# Patient Record
Sex: FEMALE | Race: WHITE | NOT HISPANIC OR LATINO | Employment: FULL TIME | URBAN - METROPOLITAN AREA
[De-identification: names, ages, dates, MRNs, and addresses within clinical notes are randomized per-mention and may not be internally consistent; named-entity substitution may affect disease eponyms.]

---

## 2019-07-01 ENCOUNTER — TRANSFERRED RECORDS (OUTPATIENT)
Dept: HEALTH INFORMATION MANAGEMENT | Facility: CLINIC | Age: 24
End: 2019-07-01

## 2019-07-01 LAB
CHLAMYDIA - HIM PATIENT REPORTED: NEGATIVE
PAP SMEAR - HIM PATIENT REPORTED: NEGATIVE

## 2019-11-12 ENCOUNTER — APPOINTMENT (OUTPATIENT)
Dept: ULTRASOUND IMAGING | Facility: CLINIC | Age: 24
End: 2019-11-12
Attending: EMERGENCY MEDICINE
Payer: COMMERCIAL

## 2019-11-12 ENCOUNTER — ANESTHESIA EVENT (OUTPATIENT)
Dept: SURGERY | Facility: CLINIC | Age: 24
End: 2019-11-12
Payer: COMMERCIAL

## 2019-11-12 ENCOUNTER — APPOINTMENT (OUTPATIENT)
Dept: GENERAL RADIOLOGY | Facility: CLINIC | Age: 24
End: 2019-11-12
Attending: INTERNAL MEDICINE
Payer: COMMERCIAL

## 2019-11-12 ENCOUNTER — ANESTHESIA (OUTPATIENT)
Dept: SURGERY | Facility: CLINIC | Age: 24
End: 2019-11-12
Payer: COMMERCIAL

## 2019-11-12 ENCOUNTER — APPOINTMENT (OUTPATIENT)
Dept: CT IMAGING | Facility: CLINIC | Age: 24
End: 2019-11-12
Attending: EMERGENCY MEDICINE
Payer: COMMERCIAL

## 2019-11-12 ENCOUNTER — HOSPITAL ENCOUNTER (INPATIENT)
Facility: CLINIC | Age: 24
LOS: 1 days | Discharge: HOME | End: 2019-11-13
Attending: EMERGENCY MEDICINE | Admitting: INTERNAL MEDICINE
Payer: COMMERCIAL

## 2019-11-12 ENCOUNTER — PREP FOR PROCEDURE (OUTPATIENT)
Dept: UROLOGY | Facility: CLINIC | Age: 24
End: 2019-11-12

## 2019-11-12 DIAGNOSIS — N20.1 RIGHT URETERAL CALCULUS: Primary | ICD-10-CM

## 2019-11-12 DIAGNOSIS — N20.1 URETEROLITHIASIS: ICD-10-CM

## 2019-11-12 DIAGNOSIS — K70.10 ALCOHOLIC HEPATITIS WITHOUT ASCITES (H): ICD-10-CM

## 2019-11-12 PROBLEM — K75.9 HEPATITIS: Status: ACTIVE | Noted: 2019-11-12

## 2019-11-12 LAB
ALBUMIN SERPL-MCNC: 4.1 G/DL (ref 3.4–5)
ALBUMIN UR-MCNC: NEGATIVE MG/DL
ALP SERPL-CCNC: 71 U/L (ref 40–150)
ALT SERPL W P-5'-P-CCNC: 539 U/L (ref 0–50)
ANION GAP SERPL CALCULATED.3IONS-SCNC: 7 MMOL/L (ref 3–14)
APPEARANCE UR: CLEAR
AST SERPL W P-5'-P-CCNC: 506 U/L (ref 0–45)
BACTERIA #/AREA URNS HPF: ABNORMAL /HPF
BASOPHILS # BLD AUTO: 0 10E9/L (ref 0–0.2)
BASOPHILS NFR BLD AUTO: 0.9 %
BILIRUB SERPL-MCNC: 0.5 MG/DL (ref 0.2–1.3)
BILIRUB UR QL STRIP: NEGATIVE
BUN SERPL-MCNC: 9 MG/DL (ref 7–30)
CALCIUM SERPL-MCNC: 8.6 MG/DL (ref 8.5–10.1)
CHLORIDE SERPL-SCNC: 109 MMOL/L (ref 94–109)
CO2 SERPL-SCNC: 29 MMOL/L (ref 20–32)
COLOR UR AUTO: ABNORMAL
CREAT SERPL-MCNC: 0.76 MG/DL (ref 0.52–1.04)
DIFFERENTIAL METHOD BLD: ABNORMAL
EOSINOPHIL # BLD AUTO: 0.2 10E9/L (ref 0–0.7)
EOSINOPHIL NFR BLD AUTO: 6.5 %
ERYTHROCYTE [DISTWIDTH] IN BLOOD BY AUTOMATED COUNT: 13.3 % (ref 10–15)
ETHANOL SERPL-MCNC: 0.24 G/DL
GFR SERPL CREATININE-BSD FRML MDRD: >90 ML/MIN/{1.73_M2}
GLUCOSE SERPL-MCNC: 103 MG/DL (ref 70–99)
GLUCOSE UR STRIP-MCNC: NEGATIVE MG/DL
HCG SERPL QL: NEGATIVE
HCT VFR BLD AUTO: 45.2 % (ref 35–47)
HGB BLD-MCNC: 14.4 G/DL (ref 11.7–15.7)
HGB UR QL STRIP: ABNORMAL
IMM GRANULOCYTES # BLD: 0 10E9/L (ref 0–0.4)
IMM GRANULOCYTES NFR BLD: 0.3 %
KETONES UR STRIP-MCNC: NEGATIVE MG/DL
LEUKOCYTE ESTERASE UR QL STRIP: NEGATIVE
LIPASE SERPL-CCNC: 63 U/L (ref 73–393)
LYMPHOCYTES # BLD AUTO: 1.4 10E9/L (ref 0.8–5.3)
LYMPHOCYTES NFR BLD AUTO: 41.2 %
MCH RBC QN AUTO: 30.4 PG (ref 26.5–33)
MCHC RBC AUTO-ENTMCNC: 31.9 G/DL (ref 31.5–36.5)
MCV RBC AUTO: 95 FL (ref 78–100)
MONOCYTES # BLD AUTO: 0.3 10E9/L (ref 0–1.3)
MONOCYTES NFR BLD AUTO: 9.4 %
MUCOUS THREADS #/AREA URNS LPF: PRESENT /LPF
NEUTROPHILS # BLD AUTO: 1.4 10E9/L (ref 1.6–8.3)
NEUTROPHILS NFR BLD AUTO: 41.7 %
NITRATE UR QL: NEGATIVE
NRBC # BLD AUTO: 0 10*3/UL
NRBC BLD AUTO-RTO: 0 /100
PH UR STRIP: 6 PH (ref 5–7)
PLATELET # BLD AUTO: 159 10E9/L (ref 150–450)
POTASSIUM SERPL-SCNC: 3.7 MMOL/L (ref 3.4–5.3)
PROT SERPL-MCNC: 7.4 G/DL (ref 6.8–8.8)
RBC # BLD AUTO: 4.74 10E12/L (ref 3.8–5.2)
RBC #/AREA URNS AUTO: 2 /HPF (ref 0–2)
SODIUM SERPL-SCNC: 145 MMOL/L (ref 133–144)
SOURCE: ABNORMAL
SP GR UR STRIP: 1.01 (ref 1–1.03)
SQUAMOUS #/AREA URNS AUTO: <1 /HPF (ref 0–1)
UROBILINOGEN UR STRIP-MCNC: NORMAL MG/DL (ref 0–2)
WBC # BLD AUTO: 3.4 10E9/L (ref 4–11)
WBC #/AREA URNS AUTO: 1 /HPF (ref 0–5)

## 2019-11-12 PROCEDURE — 96361 HYDRATE IV INFUSION ADD-ON: CPT

## 2019-11-12 PROCEDURE — 40000278 XR SURGERY CARM FLUORO LESS THAN 5 MIN: Mod: TC

## 2019-11-12 PROCEDURE — 25000128 H RX IP 250 OP 636: Performed by: NURSE ANESTHETIST, CERTIFIED REGISTERED

## 2019-11-12 PROCEDURE — 99285 EMERGENCY DEPT VISIT HI MDM: CPT | Mod: 25

## 2019-11-12 PROCEDURE — 25000128 H RX IP 250 OP 636: Performed by: PHYSICIAN ASSISTANT

## 2019-11-12 PROCEDURE — 27210794 ZZH OR GENERAL SUPPLY STERILE: Performed by: UROLOGY

## 2019-11-12 PROCEDURE — 96374 THER/PROPH/DIAG INJ IV PUSH: CPT

## 2019-11-12 PROCEDURE — 37000008 ZZH ANESTHESIA TECHNICAL FEE, 1ST 30 MIN: Performed by: UROLOGY

## 2019-11-12 PROCEDURE — 25000128 H RX IP 250 OP 636: Performed by: EMERGENCY MEDICINE

## 2019-11-12 PROCEDURE — 25000125 ZZHC RX 250: Performed by: NURSE ANESTHETIST, CERTIFIED REGISTERED

## 2019-11-12 PROCEDURE — 80320 DRUG SCREEN QUANTALCOHOLS: CPT | Performed by: INTERNAL MEDICINE

## 2019-11-12 PROCEDURE — 25000132 ZZH RX MED GY IP 250 OP 250 PS 637: Performed by: UROLOGY

## 2019-11-12 PROCEDURE — 81001 URINALYSIS AUTO W/SCOPE: CPT | Performed by: EMERGENCY MEDICINE

## 2019-11-12 PROCEDURE — 36000054 ZZH SURGERY LEVEL 2 W FLUORO 1ST 30 MIN: Performed by: UROLOGY

## 2019-11-12 PROCEDURE — 25800025 ZZH RX 258: Performed by: UROLOGY

## 2019-11-12 PROCEDURE — 25800030 ZZH RX IP 258 OP 636: Performed by: NURSE ANESTHETIST, CERTIFIED REGISTERED

## 2019-11-12 PROCEDURE — 84703 CHORIONIC GONADOTROPIN ASSAY: CPT | Performed by: EMERGENCY MEDICINE

## 2019-11-12 PROCEDURE — 25800030 ZZH RX IP 258 OP 636: Performed by: EMERGENCY MEDICINE

## 2019-11-12 PROCEDURE — 12000000 ZZH R&B MED SURG/OB

## 2019-11-12 PROCEDURE — 40000306 ZZH STATISTIC PRE PROC ASSESS II: Performed by: UROLOGY

## 2019-11-12 PROCEDURE — 76700 US EXAM ABDOM COMPLETE: CPT

## 2019-11-12 PROCEDURE — 74176 CT ABD & PELVIS W/O CONTRAST: CPT

## 2019-11-12 PROCEDURE — 83690 ASSAY OF LIPASE: CPT | Performed by: EMERGENCY MEDICINE

## 2019-11-12 PROCEDURE — 0T768DZ DILATION OF RIGHT URETER WITH INTRALUMINAL DEVICE, VIA NATURAL OR ARTIFICIAL OPENING ENDOSCOPIC: ICD-10-PCS | Performed by: UROLOGY

## 2019-11-12 PROCEDURE — 52332 CYSTOSCOPY AND TREATMENT: CPT | Mod: RT | Performed by: UROLOGY

## 2019-11-12 PROCEDURE — 96376 TX/PRO/DX INJ SAME DRUG ADON: CPT

## 2019-11-12 PROCEDURE — 85025 COMPLETE CBC W/AUTO DIFF WBC: CPT | Performed by: EMERGENCY MEDICINE

## 2019-11-12 PROCEDURE — 25000132 ZZH RX MED GY IP 250 OP 250 PS 637: Performed by: PHYSICIAN ASSISTANT

## 2019-11-12 PROCEDURE — 25800030 ZZH RX IP 258 OP 636: Performed by: PHYSICIAN ASSISTANT

## 2019-11-12 PROCEDURE — C2617 STENT, NON-COR, TEM W/O DEL: HCPCS | Performed by: UROLOGY

## 2019-11-12 PROCEDURE — 96375 TX/PRO/DX INJ NEW DRUG ADDON: CPT

## 2019-11-12 PROCEDURE — 25000128 H RX IP 250 OP 636: Performed by: UROLOGY

## 2019-11-12 PROCEDURE — C1769 GUIDE WIRE: HCPCS | Performed by: UROLOGY

## 2019-11-12 PROCEDURE — 80320 DRUG SCREEN QUANTALCOHOLS: CPT | Performed by: EMERGENCY MEDICINE

## 2019-11-12 PROCEDURE — 71000012 ZZH RECOVERY PHASE 1 LEVEL 1 FIRST HR: Performed by: UROLOGY

## 2019-11-12 PROCEDURE — 99222 1ST HOSP IP/OBS MODERATE 55: CPT | Mod: AI | Performed by: INTERNAL MEDICINE

## 2019-11-12 PROCEDURE — 80053 COMPREHEN METABOLIC PANEL: CPT | Performed by: EMERGENCY MEDICINE

## 2019-11-12 DEVICE — STENT URETERAL POLARIS ULTRA 5FRX24CM M0061921220
Type: IMPLANTABLE DEVICE | Site: URETER | Status: NON-FUNCTIONAL
Removed: 2019-12-26

## 2019-11-12 RX ORDER — PROCHLORPERAZINE 25 MG
25 SUPPOSITORY, RECTAL RECTAL EVERY 12 HOURS PRN
Status: DISCONTINUED | OUTPATIENT
Start: 2019-11-12 | End: 2019-11-13 | Stop reason: HOSPADM

## 2019-11-12 RX ORDER — LEVONORGESTREL / ETHINYL ESTRADIOL AND ETHINYL ESTRADIOL 150-30(84)
1 KIT ORAL DAILY
COMMUNITY
End: 2020-07-02

## 2019-11-12 RX ORDER — MIRTAZAPINE 45 MG/1
45 TABLET, FILM COATED ORAL
COMMUNITY
End: 2019-12-04

## 2019-11-12 RX ORDER — ONDANSETRON 2 MG/ML
4 INJECTION INTRAMUSCULAR; INTRAVENOUS EVERY 30 MIN PRN
Status: COMPLETED | OUTPATIENT
Start: 2019-11-12 | End: 2019-11-12

## 2019-11-12 RX ORDER — AMOXICILLIN 250 MG
2 CAPSULE ORAL 2 TIMES DAILY PRN
Status: DISCONTINUED | OUTPATIENT
Start: 2019-11-12 | End: 2019-11-13 | Stop reason: HOSPADM

## 2019-11-12 RX ORDER — PROPOFOL 10 MG/ML
INJECTION, EMULSION INTRAVENOUS PRN
Status: DISCONTINUED | OUTPATIENT
Start: 2019-11-12 | End: 2019-11-12

## 2019-11-12 RX ORDER — NALOXONE HYDROCHLORIDE 0.4 MG/ML
.1-.4 INJECTION, SOLUTION INTRAMUSCULAR; INTRAVENOUS; SUBCUTANEOUS
Status: DISCONTINUED | OUTPATIENT
Start: 2019-11-12 | End: 2019-11-12 | Stop reason: HOSPADM

## 2019-11-12 RX ORDER — DEXAMETHASONE SODIUM PHOSPHATE 4 MG/ML
INJECTION, SOLUTION INTRA-ARTICULAR; INTRALESIONAL; INTRAMUSCULAR; INTRAVENOUS; SOFT TISSUE PRN
Status: DISCONTINUED | OUTPATIENT
Start: 2019-11-12 | End: 2019-11-12

## 2019-11-12 RX ORDER — CIPROFLOXACIN 250 MG/1
250 TABLET, FILM COATED ORAL EVERY 12 HOURS SCHEDULED
Status: DISCONTINUED | OUTPATIENT
Start: 2019-11-12 | End: 2019-11-13 | Stop reason: HOSPADM

## 2019-11-12 RX ORDER — TAMSULOSIN HYDROCHLORIDE 0.4 MG/1
0.4 CAPSULE ORAL DAILY
Status: DISCONTINUED | OUTPATIENT
Start: 2019-11-12 | End: 2019-11-12

## 2019-11-12 RX ORDER — MORPHINE SULFATE 4 MG/ML
4 INJECTION, SOLUTION INTRAMUSCULAR; INTRAVENOUS
Status: DISCONTINUED | OUTPATIENT
Start: 2019-11-12 | End: 2019-11-12

## 2019-11-12 RX ORDER — CEFAZOLIN SODIUM 2 G/50ML
2 SOLUTION INTRAVENOUS
Status: CANCELLED | OUTPATIENT
Start: 2019-11-12

## 2019-11-12 RX ORDER — LIDOCAINE 40 MG/G
CREAM TOPICAL
Status: DISCONTINUED | OUTPATIENT
Start: 2019-11-12 | End: 2019-11-13 | Stop reason: HOSPADM

## 2019-11-12 RX ORDER — ONDANSETRON 4 MG/1
4 TABLET, ORALLY DISINTEGRATING ORAL EVERY 30 MIN PRN
Status: DISCONTINUED | OUTPATIENT
Start: 2019-11-12 | End: 2019-11-12 | Stop reason: HOSPADM

## 2019-11-12 RX ORDER — HYDRALAZINE HYDROCHLORIDE 20 MG/ML
2.5-5 INJECTION INTRAMUSCULAR; INTRAVENOUS EVERY 10 MIN PRN
Status: DISCONTINUED | OUTPATIENT
Start: 2019-11-12 | End: 2019-11-12 | Stop reason: HOSPADM

## 2019-11-12 RX ORDER — CEFAZOLIN SODIUM 1 G/50ML
1 INJECTION, SOLUTION INTRAVENOUS SEE ADMIN INSTRUCTIONS
Status: CANCELLED | OUTPATIENT
Start: 2019-11-12

## 2019-11-12 RX ORDER — AMOXICILLIN 250 MG
1 CAPSULE ORAL 2 TIMES DAILY PRN
Status: DISCONTINUED | OUTPATIENT
Start: 2019-11-12 | End: 2019-11-13 | Stop reason: HOSPADM

## 2019-11-12 RX ORDER — FENTANYL CITRATE 50 UG/ML
25-50 INJECTION, SOLUTION INTRAMUSCULAR; INTRAVENOUS
Status: DISCONTINUED | OUTPATIENT
Start: 2019-11-12 | End: 2019-11-12 | Stop reason: HOSPADM

## 2019-11-12 RX ORDER — SODIUM CHLORIDE, SODIUM LACTATE, POTASSIUM CHLORIDE, CALCIUM CHLORIDE 600; 310; 30; 20 MG/100ML; MG/100ML; MG/100ML; MG/100ML
INJECTION, SOLUTION INTRAVENOUS CONTINUOUS
Status: DISCONTINUED | OUTPATIENT
Start: 2019-11-12 | End: 2019-11-12 | Stop reason: HOSPADM

## 2019-11-12 RX ORDER — KETOROLAC TROMETHAMINE 30 MG/ML
30 INJECTION, SOLUTION INTRAMUSCULAR; INTRAVENOUS ONCE
Status: COMPLETED | OUTPATIENT
Start: 2019-11-12 | End: 2019-11-12

## 2019-11-12 RX ORDER — SODIUM CHLORIDE, SODIUM LACTATE, POTASSIUM CHLORIDE, CALCIUM CHLORIDE 600; 310; 30; 20 MG/100ML; MG/100ML; MG/100ML; MG/100ML
INJECTION, SOLUTION INTRAVENOUS CONTINUOUS PRN
Status: DISCONTINUED | OUTPATIENT
Start: 2019-11-12 | End: 2019-11-12

## 2019-11-12 RX ORDER — HYDROMORPHONE HYDROCHLORIDE 1 MG/ML
.3-.5 INJECTION, SOLUTION INTRAMUSCULAR; INTRAVENOUS; SUBCUTANEOUS
Status: DISCONTINUED | OUTPATIENT
Start: 2019-11-12 | End: 2019-11-12

## 2019-11-12 RX ORDER — LANOLIN ALCOHOL/MO/W.PET/CERES
3 CREAM (GRAM) TOPICAL
Status: DISCONTINUED | OUTPATIENT
Start: 2019-11-12 | End: 2019-11-13 | Stop reason: HOSPADM

## 2019-11-12 RX ORDER — ONDANSETRON 4 MG/1
4 TABLET, ORALLY DISINTEGRATING ORAL EVERY 6 HOURS PRN
Status: DISCONTINUED | OUTPATIENT
Start: 2019-11-12 | End: 2019-11-13 | Stop reason: HOSPADM

## 2019-11-12 RX ORDER — LIDOCAINE HYDROCHLORIDE 10 MG/ML
INJECTION, SOLUTION INFILTRATION; PERINEURAL PRN
Status: DISCONTINUED | OUTPATIENT
Start: 2019-11-12 | End: 2019-11-12

## 2019-11-12 RX ORDER — KETOROLAC TROMETHAMINE 15 MG/ML
15 INJECTION, SOLUTION INTRAMUSCULAR; INTRAVENOUS EVERY 6 HOURS PRN
Status: DISCONTINUED | OUTPATIENT
Start: 2019-11-12 | End: 2019-11-13 | Stop reason: HOSPADM

## 2019-11-12 RX ORDER — HYDROMORPHONE HYDROCHLORIDE 1 MG/ML
.3-.5 INJECTION, SOLUTION INTRAMUSCULAR; INTRAVENOUS; SUBCUTANEOUS EVERY 10 MIN PRN
Status: DISCONTINUED | OUTPATIENT
Start: 2019-11-12 | End: 2019-11-12 | Stop reason: HOSPADM

## 2019-11-12 RX ORDER — ONDANSETRON 2 MG/ML
4 INJECTION INTRAMUSCULAR; INTRAVENOUS EVERY 30 MIN PRN
Status: DISCONTINUED | OUTPATIENT
Start: 2019-11-12 | End: 2019-11-12 | Stop reason: HOSPADM

## 2019-11-12 RX ORDER — MIRTAZAPINE 15 MG/1
45 TABLET, FILM COATED ORAL AT BEDTIME
Status: DISCONTINUED | OUTPATIENT
Start: 2019-11-12 | End: 2019-11-13 | Stop reason: HOSPADM

## 2019-11-12 RX ORDER — FENTANYL CITRATE 50 UG/ML
INJECTION, SOLUTION INTRAMUSCULAR; INTRAVENOUS PRN
Status: DISCONTINUED | OUTPATIENT
Start: 2019-11-12 | End: 2019-11-12

## 2019-11-12 RX ORDER — GLYCOPYRROLATE 0.2 MG/ML
INJECTION, SOLUTION INTRAMUSCULAR; INTRAVENOUS PRN
Status: DISCONTINUED | OUTPATIENT
Start: 2019-11-12 | End: 2019-11-12

## 2019-11-12 RX ORDER — ONDANSETRON 2 MG/ML
4 INJECTION INTRAMUSCULAR; INTRAVENOUS EVERY 6 HOURS PRN
Status: DISCONTINUED | OUTPATIENT
Start: 2019-11-12 | End: 2019-11-13 | Stop reason: HOSPADM

## 2019-11-12 RX ORDER — PROCHLORPERAZINE MALEATE 5 MG
10 TABLET ORAL EVERY 6 HOURS PRN
Status: DISCONTINUED | OUTPATIENT
Start: 2019-11-12 | End: 2019-11-13 | Stop reason: HOSPADM

## 2019-11-12 RX ORDER — NALOXONE HYDROCHLORIDE 0.4 MG/ML
.1-.4 INJECTION, SOLUTION INTRAMUSCULAR; INTRAVENOUS; SUBCUTANEOUS
Status: DISCONTINUED | OUTPATIENT
Start: 2019-11-12 | End: 2019-11-13 | Stop reason: HOSPADM

## 2019-11-12 RX ORDER — LABETALOL 20 MG/4 ML (5 MG/ML) INTRAVENOUS SYRINGE
10
Status: DISCONTINUED | OUTPATIENT
Start: 2019-11-12 | End: 2019-11-12 | Stop reason: HOSPADM

## 2019-11-12 RX ORDER — MEPERIDINE HYDROCHLORIDE 50 MG/ML
12.5 INJECTION INTRAMUSCULAR; INTRAVENOUS; SUBCUTANEOUS
Status: DISCONTINUED | OUTPATIENT
Start: 2019-11-12 | End: 2019-11-12

## 2019-11-12 RX ORDER — SODIUM CHLORIDE AND POTASSIUM CHLORIDE 150; 900 MG/100ML; MG/100ML
INJECTION, SOLUTION INTRAVENOUS CONTINUOUS
Status: DISCONTINUED | OUTPATIENT
Start: 2019-11-12 | End: 2019-11-13 | Stop reason: HOSPADM

## 2019-11-12 RX ADMIN — MORPHINE SULFATE 4 MG: 4 INJECTION INTRAVENOUS at 05:37

## 2019-11-12 RX ADMIN — ONDANSETRON HYDROCHLORIDE 4 MG: 2 INJECTION, SOLUTION INTRAMUSCULAR; INTRAVENOUS at 05:37

## 2019-11-12 RX ADMIN — ONDANSETRON 4 MG: 2 INJECTION INTRAMUSCULAR; INTRAVENOUS at 18:21

## 2019-11-12 RX ADMIN — PROCHLORPERAZINE EDISYLATE 10 MG: 5 INJECTION, SOLUTION INTRAMUSCULAR; INTRAVENOUS at 13:38

## 2019-11-12 RX ADMIN — KETOROLAC TROMETHAMINE 30 MG: 30 INJECTION, SOLUTION INTRAMUSCULAR at 06:06

## 2019-11-12 RX ADMIN — TAMSULOSIN HYDROCHLORIDE 0.4 MG: 0.4 CAPSULE ORAL at 08:50

## 2019-11-12 RX ADMIN — CIPROFLOXACIN HYDROCHLORIDE 250 MG: 250 TABLET, FILM COATED ORAL at 22:38

## 2019-11-12 RX ADMIN — SODIUM CHLORIDE 1000 ML: 9 INJECTION, SOLUTION INTRAVENOUS at 11:24

## 2019-11-12 RX ADMIN — POTASSIUM CHLORIDE AND SODIUM CHLORIDE: 900; 150 INJECTION, SOLUTION INTRAVENOUS at 12:27

## 2019-11-12 RX ADMIN — DEXAMETHASONE SODIUM PHOSPHATE 4 MG: 4 INJECTION, SOLUTION INTRA-ARTICULAR; INTRALESIONAL; INTRAMUSCULAR; INTRAVENOUS; SOFT TISSUE at 18:16

## 2019-11-12 RX ADMIN — SODIUM CHLORIDE 1000 ML: 9 INJECTION, SOLUTION INTRAVENOUS at 05:37

## 2019-11-12 RX ADMIN — HYDROMORPHONE HYDROCHLORIDE 0.5 MG: 1 INJECTION, SOLUTION INTRAMUSCULAR; INTRAVENOUS; SUBCUTANEOUS at 13:10

## 2019-11-12 RX ADMIN — MIRTAZAPINE 45 MG: 15 TABLET, FILM COATED ORAL at 22:38

## 2019-11-12 RX ADMIN — GLYCOPYRROLATE 0.2 MG: 0.2 INJECTION, SOLUTION INTRAMUSCULAR; INTRAVENOUS at 18:16

## 2019-11-12 RX ADMIN — POTASSIUM CHLORIDE AND SODIUM CHLORIDE: 900; 150 INJECTION, SOLUTION INTRAVENOUS at 21:28

## 2019-11-12 RX ADMIN — ONDANSETRON HYDROCHLORIDE 4 MG: 2 INJECTION, SOLUTION INTRAMUSCULAR; INTRAVENOUS at 06:58

## 2019-11-12 RX ADMIN — FENTANYL CITRATE 50 MCG: 50 INJECTION, SOLUTION INTRAMUSCULAR; INTRAVENOUS at 18:19

## 2019-11-12 RX ADMIN — PROPOFOL 160 MG: 10 INJECTION, EMULSION INTRAVENOUS at 18:16

## 2019-11-12 RX ADMIN — ONDANSETRON HYDROCHLORIDE 4 MG: 2 INJECTION, SOLUTION INTRAMUSCULAR; INTRAVENOUS at 10:25

## 2019-11-12 RX ADMIN — HYDROMORPHONE HYDROCHLORIDE 1 MG: 1 INJECTION, SOLUTION INTRAMUSCULAR; INTRAVENOUS; SUBCUTANEOUS at 07:00

## 2019-11-12 RX ADMIN — LIDOCAINE HYDROCHLORIDE 50 MG: 10 INJECTION, SOLUTION INFILTRATION; PERINEURAL at 18:16

## 2019-11-12 RX ADMIN — FENTANYL CITRATE 50 MCG: 50 INJECTION, SOLUTION INTRAMUSCULAR; INTRAVENOUS at 18:10

## 2019-11-12 RX ADMIN — SODIUM CHLORIDE, POTASSIUM CHLORIDE, SODIUM LACTATE AND CALCIUM CHLORIDE: 600; 310; 30; 20 INJECTION, SOLUTION INTRAVENOUS at 18:08

## 2019-11-12 RX ADMIN — MIDAZOLAM 2 MG: 1 INJECTION INTRAMUSCULAR; INTRAVENOUS at 18:08

## 2019-11-12 ASSESSMENT — ENCOUNTER SYMPTOMS
VOMITING: 0
DIARRHEA: 0
HEMATURIA: 0
FLANK PAIN: 1
DYSURIA: 0
FEVER: 0
NAUSEA: 1

## 2019-11-12 ASSESSMENT — MIFFLIN-ST. JEOR
SCORE: 1371.86
SCORE: 1343.74

## 2019-11-12 ASSESSMENT — ACTIVITIES OF DAILY LIVING (ADL): ADLS_ACUITY_SCORE: 11

## 2019-11-12 NOTE — H&P
[facility]  History and Physical  Hospitalist       Date of Admission:  11/12/2019    Assessment & Plan   Brenda Blount is a 24 year old female with history of alcohol abuse who presents with right lateral abdomen/flank pain waking her from sleep and is found to have elevated liver enzymes and a 0.6 cm right-sided kidney stone with mild hydronephrosis.     Right nephrolithiasis with mild hydronephrosis  Seen by Urology in ED.  Plan is for procedure this afternoon.  In the meantime, IV fluids, pain control, antiemetics.  NPO pending procedure.      Transaminiitis, likely alcoholic hepatitis  No jaundice, abdominal pain, or Alk Phos/bili elevation.  Limited exposure risk for Hep B and C.  History of alcohol abuse.  Underwent treatment in [place] 12/2018 - 6/2019.  Ongoing intermittent use.  Reportedly 4 shots of vodka yesterday.  Unclear of baseline LFT levels.  IV fluids.  Narcotics for renal colic only - discontinue after procedure.  EtOH level added on.  CIWA.  Recheck CMP in AM.    DVT Prophylaxis: Low Risk/Ambulatory with no VTE prophylaxis indicated  Code Status: Full Code    Disposition: Expected discharge in 2-3 days once LFTs trending down.    TOTAL TIME SPENT:  Greater than 60 minutes spent on floor time including chart review, consultation and coordination of care.    Fatoumata Blanco PA-C    Primary Care Physician   No primary care provider on file.    Chief Complaint   Right sided pain    History is obtained from the patient    History of Present Illness   Brenda Blount is a 24 year old female with history of alcohol abuse who presents with right lateral abdomen/flank pain waking her from sleep and is found to have elevated liver enzymes and a 0.6 cm right-sided kidney stone with mild hydronephrosis.  Patient was awoke from sleep today with right-sided abdominal pain.  Pain was severe and she had associated nausea.  Due to the severity of the pain, and the fact it continued, she  presented to the ER for evaluation.    In the ED, temperature 97.6, heart rate 96, blood pressure 117/88, respiration 18, SPO2 98% on room air.  Sodium 145, potassium 3.7, chloride 109, bicarb 29, BUN 9, creatinine 0.76, glucose 103.  Total bilirubin 0.5, alkaline phosphate 71, , , lipase 63.  WBC 3.4, hemoglobin 14.4, platelet 159.  UA bland.  CT Abd/Pelv showing a 0.6 cm stone.  Abd U/S showed no cholelithiasis or biliary dilatation, negative [place], small gallbladder polyps, mild right hydronephrosis with 6 mm ureteral calculus.     Patient is forthcoming with alcohol abuse history.  Notes no other illness but she does mention occasional episodes of palpitations and racing heart.  This has not been worked up in the past.  Last episode was a couple days ago and lasted for about an hour.  No associated nausea, chest pain, dyspnea, sweating. No recent fevers, chills, URI symptoms, jorge colored stools, diarrhea, headaches, vision changes, rash, or other unusual illness.  No recent travel outside the .  No recent sick contacts.  No history of STIs.  Is not currently sexually active.    Past Medical History    I have reviewed this patient's medical history and updated it with pertinent information if needed.     Alcohol abuse.  Went to treatment 12/2018 in [place] for 7 months.     Past Surgical History   I have reviewed this patient's surgical history and updated it with pertinent information if needed.    No surgeries in the past    Prior to Admission Medications   Prior to Admission Medications   Prescriptions Last Dose Informant Patient Reported? Taking?   levonorgest-eth estrad 91-Day (CAMRESE) 0.15-0.03 &0.01 MG tablet Past Week at Unknown time  Yes Yes   Sig: Take 1 tablet by mouth daily   mirtazapine (REMERON) 45 MG tablet Past Week at Unknown time  Yes Yes   Sig: Take 45 mg by mouth nightly as needed      Facility-Administered Medications: None     Allergies   No Known Allergies    Social  History   Nonsmoker.  Still drinks occasionally.  Living currently with an ex-boyfriend.  She is planning to move back in with her Mom in [place].  Works part-time.  She has a primary care provider in [place] but states she cannot go back there because she owes them money.     Family History   I have reviewed this patient's family history and updated it with pertinent information if needed.   No family history of kidney stones or liver disease.    Review of Systems   14 point comprehensive ROS undertaken with pertinent positives and negatives as above and otherwise unremarkable.     Physical Exam   Temp: 97.6  F (36.4  C) Temp src: Oral BP: 107/83 Pulse: 90 Heart Rate: 96 Resp: 16 SpO2: 97 % O2 Device: None (Room air)    Vital Signs with Ranges  Temp:  [97.6  F (36.4  C)] 97.6  F (36.4  C)  Pulse:  [88-96] 90  Heart Rate:  [96] 96  Resp:  [16-18] 16  BP: (107-117)/(83-88) 107/83  SpO2:  [97 %-100 %] 97 %  0 lbs 0 oz    GENERAL:  Pleasant, cooperative, alert. Well developed, well nourished.   HEENT: Normocephalic, atraumatic.  Extra occular mm intact.  Sclera clear. PERRL.  Mucous membranes moist.  No exudate; uvula midline.    PULMONARY: Clear to auscultation bilaterally    CARDIAC: Regular rate and rhythm.  No appreciated murmur.     ABDOMEN: Soft, nontender non distended.    MUSCULOSKELETAL:  Moving x 4 spontaneously with CMS intact x4.  Normal bulk and tone.  No LE edema.   NEURO: Alert and oriented x3.  CN II-XII grossly intact and symmetric.  No ataxia or tremor.  Nonfocal exam.  SKIN:  Warm, pink, dry.  PSYCH:  Appropriate, fluid, linear      Data   Data reviewed today:  I personally reviewed no images or EKG's today.    Recent Labs   Lab 11/12/19  0538   WBC 3.4*   HGB 14.4   MCV 95      *   POTASSIUM 3.7   CHLORIDE 109   CO2 29   BUN 9   CR 0.76   ANIONGAP 7   EDER 8.6   *   ALBUMIN 4.1   PROTTOTAL 7.4   BILITOTAL 0.5   ALKPHOS 71   *   *   LIPASE 63*       Recent Results  (from the past 24 hour(s))   CT Abdomen Pelvis w/o Contrast    Narrative    EXAM: CT ABDOMEN PELVIS W/O CONTRAST  LOCATION: [facility]  DATE/TIME: 11/12/2019 6:22 AM    INDICATION: Flank pain, stone disease suspected.  COMPARISON: None.  TECHNIQUE: CT scan of the abdomen and pelvis was performed without IV contrast. Multiplanar reformats were obtained. Dose reduction techniques were used.  CONTRAST: None.    FINDINGS:   LOWER CHEST: Normal.    HEPATOBILIARY: Within normal limits. No radiopaque gallstones or bile duct dilatation.     PANCREAS: Unremarkable.    SPLEEN: Normal.    ADRENAL GLANDS: Normal.    KIDNEYS/BLADDER: Moderate right hydronephrosis due to a 0.6 cm stone in the proximal ureter. Punctate stone at the lower pole of the right kidney. No left renal stones or bladder calculi.    BOWEL: Normal. No bowel obstruction.    LYMPH NODES: Normal.    VASCULATURE: Unremarkable.    PELVIC ORGANS: Uterus is present. No suspicious adnexal masses without contrast.    OTHER: Evaluation of the solid abdominal organs is limited by lack of intravenous contrast. No ascites.    MUSCULOSKELETAL: Normal.      Impression    IMPRESSION:   1.  Moderate right hydronephrosis due to a 0.6 cm proximal ureteral stone.  2.  Tiny right renal stone.            Abdomen US, complete    Narrative    EXAM: US ABDOMEN COMPLETE  LOCATION: [facility]  DATE/TIME: 11/12/2019 6:33 AM    INDICATION: Upper abdominal pain and elevated LFTs.  COMPARISON: CT 11/12/2019  TECHNIQUE: Complete abdominal ultrasound.    FINDINGS:    GALLBLADDER: No visible cholelithiasis or wall thickening. Gallbladder polyps measuring up to 3 mm. Sonographic Olivares's sign negative.    BILE DUCTS: No biliary dilatation. The common duct measures 5 mm.    LIVER: Hepatic steatosis.    RIGHT KIDNEY: 10.6 cm. Mild hydronephrosis. 6 mm proximal ureteral calculus.    LEFT KIDNEY: 10.2 cm. No hydronephrosis.     SPLEEN: Normal.    PANCREAS: Partial  obstruction by bowel gas.    AORTA: Normal caliber where seen proximally.     IVC: Patent where seen.    No visible ascites.      Impression    IMPRESSION:  1.  No visible cholelithiasis or biliary dilatation. Sonographic Olivares's sign negative.  2.  Small gallbladder polyps. Consider one-year follow-up.  3.  Mild right hydronephrosis due to a 6 mm proximal ureteral calculus as seen on CT.

## 2019-11-12 NOTE — ED TRIAGE NOTES
Here for right sided abdominal cramping started about 1 hour ago waking patient up from sleep. Nausea. ABCs intact.

## 2019-11-12 NOTE — PHARMACY-ADMISSION MEDICATION HISTORY
Admission medication history interview status for this patient is complete. See [facility] admission navigator for allergy information, prior to admission medications and immunization status.     Medication history interview source(s):Patient  Medication history resources (including written lists, pill bottles, clinic record):None  [facility]    Changes made to PTA medication list:  Added: camrese, remeron  Deleted: none  Changed: none    Actions taken by pharmacist (provider contacted, etc):None     Additional medication history information:None    Medication reconciliation/reorder completed by provider prior to medication history?  No     Do you take OTC medications (eg tylenol, ibuprofen, fish oil, eye/ear drops, etc)? No     For patients on insulin therapy: No      Prior to Admission medications    Medication Sig Last Dose Taking? Auth Provider   levonorgest-eth estrad 91-Day (CAMRESE) 0.15-0.03 &0.01 MG tablet Take 1 tablet by mouth daily Past Week at Unknown time Yes Unknown, Entered By History   mirtazapine (REMERON) 45 MG tablet Take 45 mg by mouth nightly as needed Past Week at Unknown time Yes Unknown, Entered By History

## 2019-11-12 NOTE — CONSULTS
[facility] Consultation by [facility]    Brenda Blount MRN# [id number]   Age: 24 year old YOB: 1995     Date of Admission:  11/12/2019    Reason for consult: Renal/ureteral calculus       Requesting PA/MD: ER       Level of consult: Consult, follow and place orders           Assessment and Plan:   Assessment:   Renal colic, proximal right ureteral stone      Plan:   To OR later today for cysto, right stent given the proximal nature of the stone on CT.  Will need second procedure for stone extraction once the ureter has been dilated and stone can move more distal.    Start flomax and continue until next procedure for stent discomfort management.   Discussed with Dr. Cruz.  Can go home from recovery if meeting post op milestones.      Selina Granda PA-C  [facility]rology  [phone]                 Chief Complaint:   Abdominal pain     History is obtained from the patient and EMR.         History of Present Illness:     This patient is a 24 year old female being admitted for abdominal pain and found to have a 6mm  Proximal right ureteral stone. First stone episode. Ca 8.6, Cr 0.76, WBC 3.4. UA trace blood.       Voiding, pain better controlled since arrival, but actively vomiting.           Past Medical History:     None          Past Surgical History:     None          Social History:     Social History     Tobacco Use     Smoking status: Not on file   Substance Use Topics     Alcohol use: Not on file             Family History:     No hx of stones.             Allergies:     No Known Allergies          Medications:     Current Facility-Administered Medications   Medication     morphine (PF) injection 4 mg     ondansetron (ZOFRAN) injection 4 mg     No current outpatient medications on file.             Review of Systems:   A comprehensive review of systems was performed and found to be negative except as described in the HPI.    /87   Pulse 88   Temp 97.6  F (36.4  C)  (Oral)   Resp 18   SpO2 100%   PSYCH: mod distress, vomiting  EYES: EOMI  MOUTH: MMM  NECK: Supple, no notable adenopathy  RESP: Unlabored breathing  CARDIAC: No LE edema  SKIN: Warm, no rashes  ABD: soft, Nontender  NEURO: AAO x3           Data:     Lab Results   Component Value Date    WBC 3.4 (L) 11/12/2019    HGB 14.4 11/12/2019    HCT 45.2 11/12/2019    MCV 95 11/12/2019     11/12/2019     Lab Results   Component Value Date    CR 0.76 11/12/2019     EXAM: CT ABDOMEN PELVIS W/O CONTRAST  LOCATION: [facility]  DATE/TIME: 11/12/2019 6:22 AM     INDICATION: Flank pain, stone disease suspected.  COMPARISON: None.  TECHNIQUE: CT scan of the abdomen and pelvis was performed without IV contrast. Multiplanar reformats were obtained. Dose reduction techniques were used.  CONTRAST: None.     FINDINGS:   LOWER CHEST: Normal.     HEPATOBILIARY: Within normal limits. No radiopaque gallstones or bile duct dilatation.      PANCREAS: Unremarkable.     SPLEEN: Normal.     ADRENAL GLANDS: Normal.     KIDNEYS/BLADDER: Moderate right hydronephrosis due to a 0.6 cm stone in the proximal ureter. Punctate stone at the lower pole of the right kidney. No left renal stones or bladder calculi.     BOWEL: Normal. No bowel obstruction.     LYMPH NODES: Normal.     VASCULATURE: Unremarkable.     PELVIC ORGANS: Uterus is present. No suspicious adnexal masses without contrast.     OTHER: Evaluation of the solid abdominal organs is limited by lack of intravenous contrast. No ascites.     MUSCULOSKELETAL: Normal.                                                                      IMPRESSION:   1.  Moderate right hydronephrosis due to a 0.6 cm proximal ureteral stone.  2.  Tiny right renal stone.

## 2019-11-12 NOTE — PLAN OF CARE
"PRIMARY DIAGNOSIS: ACUTE PAIN/hepatitis/pre op for cystoscopy   OUTPATIENT/OBSERVATION GOALS TO BE MET BEFORE DISCHARGE:  1. Pain Status: Improved but still requiring IV narcotics.    2. Return to near baseline physical activity: Yes    3. Cleared for discharge by consultants (if involved): No    Discharge Planner Nurse   Safe discharge environment identified: Yes  Barriers to discharge: Yes       Entered by: Kendra Asencio 11/12/2019 2:04 PM  Please review provider order for any additional goals.   Nurse to notify provider when observation goals have been met and patient is ready for discharge.    AOx4. Up ad jevon in room. Friend at bedside. Pt. Reporting R flank/side pain 6/10 that is relieved with IV dilaudid. Imaging shows mod. R hydronephrosis w/ 0.6cm stone. LFTs elevated. Intermittent nausea-relieved with antiemetics. Fluids running. Straining urine. OR time of 1855 for cystoscopy and R double J stent. /80 (BP Location: Left arm)   Pulse 70   Temp 98.2  F (36.8  C) (Oral)   Resp 16   Ht 1.626 m (5' 4\")   Wt 60.4 kg (133 lb 1.6 oz)   SpO2 96%   BMI 22.85 kg/m     "

## 2019-11-12 NOTE — ED PROVIDER NOTES
History     Chief Complaint:  Abdominal Pain      HPI   Brenda Blount is a 24 year old female who presents with right upper flank tenderness and nausea for the past hour that woke her from sleep. Patient reports that she felt fine upon going to bed last night. She describes her pain as sharp. Denies fever, diarrhea, dysuria, hematuria. She has not yet taken any medications for her pain. Denies the possibility of pregnancy. No history of similar symptoms in the past. No recent surgeries or hospitalizations.     Allergies:  NKDA     Medications:    The patient is not currently taking any prescribed medications.      Past Medical History:    The patient does not have any past pertinent medical history.     Past Surgical History:    History reviewed. No pertinent surgical history.     Family History:    History reviewed. No pertinent family history.      Social History:  PCP: No primary care provider on file.  Presents to the ED with a friend.  Up to date on immunization.       Review of Systems   Constitutional: Negative for fever.   Gastrointestinal: Positive for nausea. Negative for diarrhea and vomiting.   Genitourinary: Positive for flank pain. Negative for dysuria and hematuria.   All other systems reviewed and are negative.      Physical Exam     Patient Vitals for the past 24 hrs:   BP Temp Temp src Pulse Heart Rate Resp SpO2   11/12/19 0650 111/87 -- -- 88 -- -- 100 %   11/12/19 0507 117/88 97.6  F (36.4  C) Oral 96 96 18 98 %        Physical Exam  Constitutional:       Appearance: She is well-developed.   HENT:      Head: Atraumatic.      Right Ear: External ear normal.      Left Ear: External ear normal.      Mouth/Throat:      Pharynx: No oropharyngeal exudate.   Eyes:      General: No scleral icterus.     Conjunctiva/sclera: Conjunctivae normal.      Pupils: Pupils are equal, round, and reactive to light.   Neck:      Musculoskeletal: Normal range of motion and neck supple.      Vascular: No JVD.    Cardiovascular:      Rate and Rhythm: Normal rate and regular rhythm.      Heart sounds: Normal heart sounds. No murmur. No friction rub. No gallop.    Pulmonary:      Effort: Pulmonary effort is normal. No respiratory distress.      Breath sounds: Normal breath sounds. No wheezing or rales.   Abdominal:      General: Bowel sounds are normal. There is no distension.      Palpations: Abdomen is soft. There is no mass.      Tenderness: There is tenderness (right upper flank).   Musculoskeletal: Normal range of motion.   Lymphadenopathy:      Cervical: No cervical adenopathy.   Skin:     General: Skin is warm and dry.      Findings: No rash.   Neurological:      Mental Status: She is alert and oriented to person, place, and time.      Cranial Nerves: No cranial nerve deficit.         Emergency Department Course     Imaging:  Radiographic findings were communicated with the patient who voiced understanding of the findings.    CT Abdomen Pelvis w/o Contrast  1.  Moderate right hydronephrosis due to a 0.6 cm proximal ureteral stone.  2.  Tiny right renal stone.  As read by Radiology.    Abdomen US, complete  1.  No visible cholelithiasis or biliary dilatation. Sonographic Olivares's sign negative.  2.  Small gallbladder polyps. Consider one-year follow-up.  3.  Mild right hydronephrosis due to a 6 mm proximal ureteral calculus as seen on CT.  As read by Radiology.    Laboratory:  UA: blood trace, bacteria few, mucous present, o/w negative     CMP: Sodium 145 (H), Glucose 103 (H),  (H),  (H), o/w WNL (Creatinine: 0.76)  CBC: WBC 3.4 (L), HGB 14.4,   Lipase: 63 (L)  HCG qual: negative     Interventions:  0537: NS 1L IV Bolus   0537: Morphine 4 mg IV  0537: Zofran 4 mg IV  0606: Toradol 30 mg IV  0658: Zofran 4 mg IV  0700: Dilaudid 1 mg IV    Emergency Department Course:  0511: Nursing notes and vitals reviewed. I performed an exam of the patient as documented above.     IV inserted. Medicine  administered as documented above. Blood drawn. This was sent to the lab for further testing, results above. The patient provided a urine sample here in the emergency department. This was sent for laboratory testing, findings above.      The patient was sent for an abdomen pelvis CT and abdomen ultrasound while in the emergency department, findings above.     0655: I rechecked the patient and discussed the results of her workup thus far. Patient reports that she has recently finished treatment for alcohol use but has been drinking intermittently. She drank 4 shots of vodka last night. She said dilaudid helped her pain but now she is having pain again.     0705: I consulted with Dr. Avila, Urology, regarding the patient's history and presentation here in the emergency department.    I consulted with the hospitalist services. They are in agreement to accept the patient for admission.    Findings and plan explained to the Patient who consents to admission.        Impression & Plan      Medical Decision Making:  Brenda Blount is a 24 year old female who presents with right sided flank and upper abdominal pain. Patient was quite uncomfortable initially and did not respond to morphine. She was given Toradol and dilaudid. Urine was negative for signs of infection, but CT did show a 6 mm stone on the right side with hydro. She interestingly enough had very high AST and ALT, so I did do an ultrasound to evaluate that. She did admit that she has been in treatment for alcoholism and has recently been drinking intermittently. She did admit to 3-4 shots of vodka last night, but she says she does not drink more than that. She is still quite uncomfortable and will need to be monitored for the hepatitis and will most likely be seen by urology to have a stent placed for her stone. She is comfortable with admission. She will be admitted to a regular med/surg bed.     Diagnosis:    ICD-10-CM    1. Ureterolithiasis N20.1    2.  Alcoholic hepatitis without ascites K70.10        Disposition:  Admitted to the hospital.     I, Kiara Montez, am serving as a scribe at 5:11 AM on 11/12/2019 to document services personally performed by Hardeep Qureshi MD based on my observations and the provider's statements to me.       Kiara Montez  11/12/2019   [facility] EMERGENCY DEPARTMENT       Hardeep Qureshi MD  11/13/19 0414

## 2019-11-12 NOTE — PLAN OF CARE
ROOM # 233    Living Situation (if not independent, order SW consult): Home with parents  Facility name: N/A  : Jayde (Mom) [phone]    Activity level at baseline: INd  Activity level on admit: Ind      Patient registered to observation; given Patient Bill of Rights; given the opportunity to ask questions about observation status and their plan of care.  Patient has been oriented to the observation room, bathroom and call light is in place.    Discussed discharge goals and expectations with patient/family.

## 2019-11-12 NOTE — ED NOTES
[facility]  ED Nurse Handoff Report    Brenda Blount is a 24 year old female   ED Chief complaint: Abdominal Pain  . ED Diagnosis:   Final diagnoses:   Ureterolithiasis   Alcoholic hepatitis without ascites     Allergies: No Known Allergies    Code Status: Full Code  Activity level - Baseline/Home:  Independent. Activity Level - Current:   Independent. Lift room needed: No. Bariatric: No   Needed: No   Isolation: No. Infection: Not Applicable.     Vital Signs:   Vitals:    11/12/19 0507 11/12/19 0650   BP: 117/88 111/87   Pulse: 96 88   Resp: 18    Temp: 97.6  F (36.4  C)    TempSrc: Oral    SpO2: 98% 100%       Cardiac Rhythm:  ,      Pain level: 0-10 Pain Scale: 0  Patient confused: No. Patient Falls Risk: No.   Elimination Status: Has voided   Patient Report - Initial Complaint: Here for right sided abdominal cramping started about 1 hour ago waking patient up from sleep. Nausea. ABCs intact. . Focused Assessment: Right flank pain, vomiting   Tests Performed: labs, CT, US. Abnormal Results:   Labs Ordered and Resulted from Time of ED Arrival Up to the Time of Departure from the ED   CBC WITH PLATELETS DIFFERENTIAL - Abnormal; Notable for the following components:       Result Value    WBC 3.4 (*)     Absolute Neutrophil 1.4 (*)     All other components within normal limits   COMPREHENSIVE METABOLIC PANEL - Abnormal; Notable for the following components:    Sodium 145 (*)     Glucose 103 (*)      (*)      (*)     All other components within normal limits   ROUTINE UA WITH MICROSCOPIC - Abnormal; Notable for the following components:    Blood Urine Trace (*)     Bacteria Urine Few (*)     Mucous Urine Present (*)     All other components within normal limits   LIPASE - Abnormal; Notable for the following components:    Lipase 63 (*)     All other components within normal limits   HCG QUALITATIVE   PULSE OXIMETRY NURSING   STRAIN URINE   PERIPHERAL IV CATHETER     Abdomen  US, complete   Final Result   IMPRESSION:   1.  No visible cholelithiasis or biliary dilatation. Sonographic Olivares's sign negative.   2.  Small gallbladder polyps. Consider one-year follow-up.   3.  Mild right hydronephrosis due to a 6 mm proximal ureteral calculus as seen on CT.      CT Abdomen Pelvis w/o Contrast   Final Result   IMPRESSION:    1.  Moderate right hydronephrosis due to a 0.6 cm proximal ureteral stone.   2.  Tiny right renal stone.                    .   Treatments provided: IV fluids, pain & nausea control  Family Comments: none  OBS brochure/video discussed/provided to patient:  Informed [place] [facility] [place] going to observation floor but not going to be in observation unit, so the OBS video is not required.  ED Medications:   Medications   morphine (PF) injection 4 mg (4 mg Intravenous Given 11/12/19 0537)   ondansetron (ZOFRAN) injection 4 mg (4 mg Intravenous Given 11/12/19 0658)   0.9% sodium chloride BOLUS (0 mLs Intravenous Stopped 11/12/19 0701)   ketorolac (TORADOL) injection 30 mg (30 mg Intravenous Given 11/12/19 0606)   HYDROmorphone (DILAUDID) injection 1 mg (1 mg Intravenous Given 11/12/19 0700)     Drips infusing:  No  For the majority of the shift, the patient's behavior Green. Interventions performed were none.     Severe Sepsis OR Septic Shock Diagnosis Present: No      ED Nurse Name/Phone Number: Ana Rosa Negron RN,   7:40 AM    RECEIVING UNIT ED HANDOFF REVIEW    Above ED Nurse Handoff Report was reviewed: Yes  Reviewed by: Kendra Asencio RN on November 12, 2019 at 10:21 AM

## 2019-11-12 NOTE — ANESTHESIA PREPROCEDURE EVALUATION
Anesthesia Pre-Procedure Evaluation    Patient: Brenda Blount   MRN: [id number] : 1995          Preoperative Diagnosis: Kidney stone [N20.0]    Procedure(s):  Cytoscopy with Right double J stent    No past medical history on file.  No past surgical history on file.  Anesthesia Evaluation     . Pt has had prior anesthetic.            ROS/MED HX    ENT/Pulmonary:  - neg pulmonary ROS     Neurologic:  - neg neurologic ROS     Cardiovascular:  - neg cardiovascular ROS       METS/Exercise Tolerance:     Hematologic:         Musculoskeletal:  - neg musculoskeletal ROS       GI/Hepatic:     (+) hepatitis type Alcoholic, Other GI/Hepatic h/o etoh      Renal/Genitourinary:     (+) Nephrolithiasis ,       Endo:  - neg endo ROS       Psychiatric:  - neg psychiatric ROS       Infectious Disease:         Malignancy:         Other:                          Physical Exam  Normal systems: cardiovascular and pulmonary    Airway   Mallampati: II  TM distance: >3 FB  Neck ROM: full    Dental     Cardiovascular       Pulmonary             Lab Results   Component Value Date    WBC 3.4 (L) 2019    HGB 14.4 2019    HCT 45.2 2019     2019     (H) 2019    POTASSIUM 3.7 2019    CHLORIDE 109 2019    CO2 29 2019    BUN 9 2019    CR 0.76 2019     (H) 2019    EDER 8.6 2019    ALBUMIN 4.1 2019    PROTTOTAL 7.4 2019     (HH) 2019     (HH) 2019    ALKPHOS 71 2019    BILITOTAL 0.5 2019    LIPASE 63 (L) 2019    HCGS Negative 2019       Preop Vitals  BP Readings from Last 3 Encounters:   19 124/80    Pulse Readings from Last 3 Encounters:   19 70      Resp Readings from Last 3 Encounters:   19 16    SpO2 Readings from Last 3 Encounters:   19 96%      Temp Readings from Last 1 Encounters:   19 98.2  F (36.8  C) (Oral)    Ht Readings from Last 1 Encounters:  "  11/12/19 1.626 m (5' 4\")      Wt Readings from Last 1 Encounters:   11/12/19 60.4 kg (133 lb 1.6 oz)    Estimated body mass index is 22.85 kg/m  as calculated from the following:    Height as of this encounter: 1.626 m (5' 4\").    Weight as of this encounter: 60.4 kg (133 lb 1.6 oz).       Anesthesia Plan      History & Physical Review  History and physical reviewed and following examination; no interval change.    ASA Status:  2 .    NPO Status:  > 8 hours    Plan for General and LMA with Intravenous and Propofol induction. Maintenance will be Balanced.    PONV prophylaxis:  Ondansetron (or other 5HT-3) and Dexamethasone or Solumedrol       Postoperative Care  Postoperative pain management:  IV analgesics.      Consents  Anesthetic plan, risks, benefits and alternatives discussed with:  Patient.  Use of blood products discussed: Yes.   Use of blood products discussed with Patient.  Consented to blood products.  .                 Ryan Ann MD                    .  "

## 2019-11-13 ENCOUNTER — PREP FOR PROCEDURE (OUTPATIENT)
Dept: UROLOGY | Facility: CLINIC | Age: 24
End: 2019-11-13

## 2019-11-13 VITALS
HEIGHT: 64 IN | DIASTOLIC BLOOD PRESSURE: 79 MMHG | OXYGEN SATURATION: 97 % | SYSTOLIC BLOOD PRESSURE: 122 MMHG | BODY MASS INDEX: 23.78 KG/M2 | RESPIRATION RATE: 18 BRPM | TEMPERATURE: 96.9 F | HEART RATE: 101 BPM | WEIGHT: 139.3 LBS

## 2019-11-13 DIAGNOSIS — N20.0 KIDNEY STONE: Primary | ICD-10-CM

## 2019-11-13 LAB
ALBUMIN SERPL-MCNC: 3.4 G/DL (ref 3.4–5)
ALP SERPL-CCNC: 61 U/L (ref 40–150)
ALT SERPL W P-5'-P-CCNC: 404 U/L (ref 0–50)
ANION GAP SERPL CALCULATED.3IONS-SCNC: 10 MMOL/L (ref 3–14)
AST SERPL W P-5'-P-CCNC: 285 U/L (ref 0–45)
BILIRUB DIRECT SERPL-MCNC: 0.3 MG/DL (ref 0–0.2)
BILIRUB SERPL-MCNC: 0.9 MG/DL (ref 0.2–1.3)
BUN SERPL-MCNC: 8 MG/DL (ref 7–30)
CALCIUM SERPL-MCNC: 8.5 MG/DL (ref 8.5–10.1)
CHLORIDE SERPL-SCNC: 109 MMOL/L (ref 94–109)
CO2 SERPL-SCNC: 22 MMOL/L (ref 20–32)
CREAT SERPL-MCNC: 0.69 MG/DL (ref 0.52–1.04)
ERYTHROCYTE [DISTWIDTH] IN BLOOD BY AUTOMATED COUNT: 13 % (ref 10–15)
GFR SERPL CREATININE-BSD FRML MDRD: >90 ML/MIN/{1.73_M2}
GLUCOSE SERPL-MCNC: 82 MG/DL (ref 70–99)
HCT VFR BLD AUTO: 39.3 % (ref 35–47)
HGB BLD-MCNC: 12.9 G/DL (ref 11.7–15.7)
MCH RBC QN AUTO: 31.1 PG (ref 26.5–33)
MCHC RBC AUTO-ENTMCNC: 32.8 G/DL (ref 31.5–36.5)
MCV RBC AUTO: 95 FL (ref 78–100)
PLATELET # BLD AUTO: 139 10E9/L (ref 150–450)
POTASSIUM SERPL-SCNC: 4.3 MMOL/L (ref 3.4–5.3)
PROT SERPL-MCNC: 6.2 G/DL (ref 6.8–8.8)
RBC # BLD AUTO: 4.15 10E12/L (ref 3.8–5.2)
SODIUM SERPL-SCNC: 141 MMOL/L (ref 133–144)
WBC # BLD AUTO: 4.7 10E9/L (ref 4–11)

## 2019-11-13 PROCEDURE — 80076 HEPATIC FUNCTION PANEL: CPT | Performed by: UROLOGY

## 2019-11-13 PROCEDURE — 80048 BASIC METABOLIC PNL TOTAL CA: CPT | Performed by: UROLOGY

## 2019-11-13 PROCEDURE — 36415 COLL VENOUS BLD VENIPUNCTURE: CPT | Performed by: UROLOGY

## 2019-11-13 PROCEDURE — 85027 COMPLETE CBC AUTOMATED: CPT | Performed by: UROLOGY

## 2019-11-13 PROCEDURE — 25000132 ZZH RX MED GY IP 250 OP 250 PS 637: Performed by: UROLOGY

## 2019-11-13 RX ORDER — TAMSULOSIN HYDROCHLORIDE 0.4 MG/1
0.4 CAPSULE ORAL DAILY
Qty: 20 CAPSULE | Refills: 0 | Status: SHIPPED | OUTPATIENT
Start: 2019-11-13 | End: 2019-12-06

## 2019-11-13 RX ADMIN — CIPROFLOXACIN HYDROCHLORIDE 250 MG: 250 TABLET, FILM COATED ORAL at 08:22

## 2019-11-13 ASSESSMENT — ACTIVITIES OF DAILY LIVING (ADL)
ADLS_ACUITY_SCORE: 11

## 2019-11-13 NOTE — PROGRESS NOTES
[facility] Urology Progress Note          Assessment and Plan:   Active Problems:    Hepatitis, renal colic    Assessment: POD 1 right ureteral stent    Plan: last dose of cipro today for post op prophy  Continue Flomax until stone procedure.  Will arrange stone procedure in 2 weeks with Dr. Cruz.     Will sign off. Pls call with questions.       Selina Granda PA-C  [facility]  [phone]               Interval History:   Feeling better, LFT improved but haven't normalized. Voiding, some hematuria.              Review of Systems:   The 5 point Review of Systems is negative other than noted in the HPI             Medications:     Current Facility-Administered Medications Ordered in Epic   Medication Dose Route Frequency Last Rate Last Dose     0.9% sodium chloride + KCl 20 mEq/L infusion   Intravenous Continuous 100 mL/hr at 11/12/19 2128       ciprofloxacin (CIPRO) tablet 250 mg  250 mg Oral Q12H JESSE   250 mg at 11/13/19 0822     influenza quadrivalent (PF) vacc (FLUZONE) injection 0.5 mL  0.5 mL Intramuscular Prior to discharge         ketorolac (TORADOL) injection 15 mg  15 mg Intravenous Q6H PRN         lidocaine (LMX4) cream   Topical Q1H PRN         lidocaine 1 % 0.1-1 mL  0.1-1 mL Other Q1H PRN         melatonin tablet 3 mg  3 mg Oral At Bedtime PRN         mirtazapine (REMERON) tablet 45 mg  45 mg Oral At Bedtime   45 mg at 11/12/19 2238     naloxone (NARCAN) injection 0.1-0.4 mg  0.1-0.4 mg Intravenous Q2 Min PRN         ondansetron (ZOFRAN-ODT) ODT tab 4 mg  4 mg Oral Q6H PRN        Or     ondansetron (ZOFRAN) injection 4 mg  4 mg Intravenous Q6H PRN   4 mg at 11/12/19 1821     prochlorperazine (COMPAZINE) injection 10 mg  10 mg Intravenous Q6H PRN   10 mg at 11/12/19 1338    Or     prochlorperazine (COMPAZINE) tablet 10 mg  10 mg Oral Q6H PRN        Or     prochlorperazine (COMPAZINE) Suppository 25 mg  25 mg Rectal Q12H PRN         senna-docusate (SENOKOT-S/PERICOLACE) 8.6-50 MG  per tablet 1 tablet  1 tablet Oral BID PRN        Or     senna-docusate (SENOKOT-S/PERICOLACE) 8.6-50 MG per tablet 2 tablet  2 tablet Oral BID PRN         sodium chloride (PF) 0.9% PF flush 3 mL  3 mL Intracatheter q1 min prn         sodium chloride (PF) 0.9% PF flush 3 mL  3 mL Intracatheter Q8H         No current [facility]-ordered outpatient medications on file.                  Physical Exam:   Vitals were reviewed  Patient Vitals for the past 8 hrs:   BP Temp Temp src Heart Rate Resp SpO2   11/13/19 0700 122/79 96.9  F (36.1  C) Oral 79 18 97 %     GEN: NAD, lying in bed  EYES: EOMI  MOUTH: MMM  NECK: Supple  RESP: Unlabored breathing  CARDIAC: No LE edema  SKIN: Warm  ABD: soft  NEURO: AAO           Data:   No results found for: NTBNPI, NTBNP  Lab Results   Component Value Date    WBC 4.7 11/13/2019    WBC 3.4 (L) 11/12/2019    HGB 12.9 11/13/2019    HGB 14.4 11/12/2019    HCT 39.3 11/13/2019    HCT 45.2 11/12/2019    MCV 95 11/13/2019    MCV 95 11/12/2019     (L) 11/13/2019     11/12/2019     No results found for: INR

## 2019-11-13 NOTE — ANESTHESIA POSTPROCEDURE EVALUATION
Patient: Brenda Blount    Procedure(s):  Cytoscopy with Right double J stent    Diagnosis:Kidney stone [N20.0]  Diagnosis Additional Information: No value filed.    Anesthesia Type:  General, LMA    Note:  Anesthesia Post Evaluation    Patient location during evaluation: PACU  Patient participation: Able to fully participate in evaluation  Level of consciousness: awake and alert  Pain management: adequate  Airway patency: patent  Cardiovascular status: acceptable  Respiratory status: acceptable  Hydration status: acceptable  PONV: controlled     Anesthetic complications: None          Last vitals:  Vitals:    11/12/19 1855 11/12/19 1900 11/12/19 1930   BP: 126/84 123/87 (!) 134/92   Pulse: 98 101    Resp: 16 15 16   Temp: 98.2  F (36.8  C)     SpO2: 99% 99% 98%         Electronically Signed By: Osmani Tadeo MD  November 12, 2019  7:36 PM

## 2019-11-13 NOTE — CONSULTS
Care Transition Initial Assessment - RN        Met with: Patient.  DATA   Active Problems:    Hepatitis       Cognitive Status: awake, alert and oriented.  [facility]  Primary Care MD Name: sacha  Contact information and PCP information verified: Yes, pt has primary care in [facility], she is staying in [place] w/ her BF for now.  She requests to establish care in [place]  Lives With: significant other                     Insurance concerns: Other pt said her BF will bring in her card today.    ASSESSMENT  Patient currently receives the following services:  none        Identified issues/concerns regarding health management: Pt is admitted with Hepatitis and renal colic  Pt would like to establish care in [place].  I scheduled her with Dr Rosen in [place] . Pt is agreeable to schedule her f/u with urology.  Pt has been drinking alcohol again.  She did go to CD treatment in [place] at [place] last December.  Pt is interested in Sobriety as she is aware of her current state of hepatitis.  She pleasantly declines any cd support as she feels she can quit independently . I gave her a hand out on CD support in case she feels she needs support after discharge.      PLAN  Patient given options and choices for discharge YES .  Patient/family is agreeable to the plan?  Yes:   Patient anticipates discharging to home with BF in [place] .  She prefers to keep her face sheet as [place], [place] for address for now.          Patient anticipates needs for home equipment: No  Transportation/person available to transport on day of discharge  is her BF and she will verify.  Plan/Disposition:home   Appointments:     As scheduled with new primary md.  She will schedule urology.   Care  (CTS) will continue to follow as needed.    Juanita Davila RN BSN   Inpatient Care Coordination  [facility]  [phone]

## 2019-11-13 NOTE — ANESTHESIA CARE TRANSFER NOTE
Patient: Brenda Blount    Procedure(s):  Cytoscopy with Right double J stent    Diagnosis: Kidney stone [N20.0]  Diagnosis Additional Information: No value filed.    Anesthesia Type:   General, LMA     Note:  Airway :Face Mask  Patient transferred to:PACU  Handoff Report: Identifed the Patient, Identified the Reponsible Provider, Reviewed the pertinent medical history, Discussed the surgical course, Reviewed Intra-OP anesthesia mangement and issues during anesthesia, Set expectations for post-procedure period and Allowed opportunity for questions and acknowledgement of understanding      Vitals: (Last set prior to Anesthesia Care Transfer)    CRNA VITALS  11/12/2019 1802 - 11/12/2019 1838      11/12/2019             Pulse:  105    SpO2:  99 %    Resp Rate (observed):  (!) 3                Electronically Signed By: Dean Dennis Severson, APRN CRNA  November 12, 2019  6:38 PM

## 2019-11-13 NOTE — OP NOTE
Procedure Date: 11/12/2019      PREOPERATIVE DIAGNOSIS:  Proximal right ureteral calculus.      POSTOPERATIVE DIAGNOSIS:  Proximal right ureteral calculus.      PROCEDURES:  Video cystoscopy, right double-J stent insertion (5-Fijian x 24 cm).      SURGEON:  Ruiz Cruz MD      ANESTHESIA:  General laryngeal mask.      ESTIMATED BLOOD LOSS:  0 mL.      INDICATIONS:  The patient is a 24-year-old female admitted through the emergency room with right flank pain.  The patient's other past medical history is significant for alcohol abuse and elevated liver functions consistent with alcoholic hepatitis.  The procedure, the alternatives, risks and followup were carefully discussed with the patient.      Laboratory studies reveal normal electrolytes, normal creatinine and normal serum calcium level.  Urinalysis shows no evidence for an infection and urine pH is 6.0.      DESCRIPTION OF THE PROCEDURE:  The patient went to cystoscopy and was placed supine on the operating table.  After adequate general laryngeal mask anesthesia, the patient was placed in lithotomy and her genitalia were prepped and draped in a sterile fashion.  A #22 Fijian Storz cystoscope with obturator was gently introduced into the bladder and residual urine was clear.  The urethra and bladder were carefully examined with the 30 degree lens and water as an irrigant using video.  No stones were seen and both ureteral orifices were normal in configuration and position.  I passed a Glidewire easily up the right ureter under fluoroscopy and could see it pass by her 5 mm stone in the proximal right ureter.  I then passed a 5-Fijian x 24 cm Polaris stent over the Glidewire into good position.  I removed the Glidewire and the stent curled in the right renal pelvis and in the bladder and there was efflux of clear urine.  The bladder was emptied and the cystoscope removed.  The patient went to recovery room in stable condition and will be readmitted to the  floor for further observation.  She will be started on Cipro 250 mg every 12 hours and she should have a dose tonight and 1 dose tomorrow only.         KAYDOE DONALD JR, MD             D: 2019   T: 2019   MT: KLEBER      Name:     LOY SHAH   MRN:      [id number]        Account:        [id number]   :      1995           Procedure Date: 2019      Document: [id number]       cc: Kayode Donald Jr, MD

## 2019-11-13 NOTE — PLAN OF CARE
Pt VSS, denies pain, urine still pink tinged.   Follow up with urology in 2 weeks for stent removal.   New primary care Dr appointment on Nov 20th  Boyfriend to transport  Forgot to offer Flu shot, called pt to advise to follow up with primary care

## 2019-11-13 NOTE — PLAN OF CARE
Patient A&Ox4, SBA, VSS, denies pain, LS clear, sats stable on RA, skin intact. CIWA 0. +BS, LBM 11/11/19, voids adequately, urine pink in color. Tolerating regular diet. On oral Cipro. Plan to discharge home tomorrow.

## 2019-11-13 NOTE — PLAN OF CARE
A&Ox4. Up SBA. Denies pain. VSS. IVF infusing. Voiding adequately. Urine pink. Significant other at bedside.

## 2019-11-14 NOTE — DISCHARGE SUMMARY
[facility]  Hospitalist Discharge Summary       Date of Admission:  11/12/2019  Date of Discharge:  11/13/2019 11:35 AM  Discharging Provider: Nile Lowry MD      Discharge Diagnoses   Ureteral stone  Alcohol related hepatitis    Follow-ups Needed After Discharge   Follow-up Appointments     Follow-up and recommended labs and tests       With new primary care as scheduled and check hepatic panel.  With Dr Cruz in 2 weeks.             Discharge Disposition   Discharged to home  Condition at discharge: Stable    Hospital Course   Brenda Blount is a 24 year old female with history of alcohol abuse who presents with right lateral abdomen/flank pain waking her from sleep and is found to have elevated liver enzymes and a 0.6 cm right-sided kidney stone with mild hydronephrosis.     Right nephrolithiasis with mild hydronephrosis  --She underwent cystoscopy by urology and placement of ureteral stent.  --She will follow-up with urology in 2 weeks for definitive treatment of the stone and removal of stent.  --He felt well was pain-free at discharge.      Transaminiitis, likely alcoholic hepatitis   --History of alcohol abuse and alcohol level was 0.24 upon admission.  Underwent treatment in [place] 12/2018 - 6/2019.    --AST and ALT are in the 500s upon admission and improved prior to discharge.  --Patient was counseled on the importance of trying to quit drinking.  She voiced understanding and will follow up with a new primary care provider in the near future as provided.    Consultations This Hospital Stay   CARE COORDINATOR IP CONSULT  CARE COORDINATOR IP CONSULT    Code Status   Full Code    Time Spent on this Encounter   I, Nile Lowry MD, personally saw the patient today and spent greater than 30 minutes discharging this patient.       Nile Lowry MD  [facility]  ______________________________________________________________________    Physical Exam    Vital Signs:                    Weight: 139 lbs 4.8 oz         Primary Care Physician   Physician No Ref-Primary    Discharge Orders      Reason for your hospital stay    Kidney stone, liver inflammation     Follow-up and recommended labs and tests     With new primary care as scheduled and check hepatic panel.  With Dr Cruz in 2 weeks.     Activity    Your activity upon discharge: activity as tolerated     Full Code     Diet    Follow this diet upon discharge: Regular.  Avoid alcohol.       Significant Results and Procedures   Most Recent 3 CBC's:  Recent Labs   Lab Test 11/13/19  0752 11/12/19  0538   WBC 4.7 3.4*   HGB 12.9 14.4   MCV 95 95   * 159     Most Recent 3 BMP's:  Recent Labs   Lab Test 11/13/19  0752 11/12/19  0538    145*   POTASSIUM 4.3 3.7   CHLORIDE 109 109   CO2 22 29   BUN 8 9   CR 0.69 0.76   ANIONGAP 10 7   EDER 8.5 8.6   GLC 82 103*   ,   Results for orders placed or performed during the hospital encounter of 11/12/19   CT Abdomen Pelvis w/o Contrast    Narrative    EXAM: CT ABDOMEN PELVIS W/O CONTRAST  LOCATION: [facility]  DATE/TIME: 11/12/2019 6:22 AM    INDICATION: Flank pain, stone disease suspected.  COMPARISON: None.  TECHNIQUE: CT scan of the abdomen and pelvis was performed without IV contrast. Multiplanar reformats were obtained. Dose reduction techniques were used.  CONTRAST: None.    FINDINGS:   LOWER CHEST: Normal.    HEPATOBILIARY: Within normal limits. No radiopaque gallstones or bile duct dilatation.     PANCREAS: Unremarkable.    SPLEEN: Normal.    ADRENAL GLANDS: Normal.    KIDNEYS/BLADDER: Moderate right hydronephrosis due to a 0.6 cm stone in the proximal ureter. Punctate stone at the lower pole of the right kidney. No left renal stones or bladder calculi.    BOWEL: Normal. No bowel obstruction.    LYMPH NODES: Normal.    VASCULATURE: Unremarkable.    PELVIC ORGANS: Uterus is present. No suspicious adnexal masses without contrast.    OTHER:  Evaluation of the solid abdominal organs is limited by lack of intravenous contrast. No ascites.    MUSCULOSKELETAL: Normal.      Impression    IMPRESSION:   1.  Moderate right hydronephrosis due to a 0.6 cm proximal ureteral stone.  2.  Tiny right renal stone.            Abdomen US, complete    Narrative    EXAM: US ABDOMEN COMPLETE  LOCATION: [facility]  DATE/TIME: 11/12/2019 6:33 AM    INDICATION: Upper abdominal pain and elevated LFTs.  COMPARISON: CT 11/12/2019  TECHNIQUE: Complete abdominal ultrasound.    FINDINGS:    GALLBLADDER: No visible cholelithiasis or wall thickening. Gallbladder polyps measuring up to 3 mm. Sonographic Olivares's sign negative.    BILE DUCTS: No biliary dilatation. The common duct measures 5 mm.    LIVER: Hepatic steatosis.    RIGHT KIDNEY: 10.6 cm. Mild hydronephrosis. 6 mm proximal ureteral calculus.    LEFT KIDNEY: 10.2 cm. No hydronephrosis.     SPLEEN: Normal.    PANCREAS: Partial obstruction by bowel gas.    AORTA: Normal caliber where seen proximally.     IVC: Patent where seen.    No visible ascites.      Impression    IMPRESSION:  1.  No visible cholelithiasis or biliary dilatation. Sonographic Olivares's sign negative.  2.  Small gallbladder polyps. Consider one-year follow-up.  3.  Mild right hydronephrosis due to a 6 mm proximal ureteral calculus as seen on CT.   XR Surgery ARIC L/T 5 Min Fluoro    Narrative    This exam was marked as non-reportable because it will not be read by a   radiologist or a [place] non-radiologist provider.                   Discharge Medications   Discharge Medication List as of 11/13/2019 11:20 AM      CONTINUE these medications which have NOT CHANGED    Details   levonorgest-eth estrad 91-Day (CAMRESE) 0.15-0.03 &0.01 MG tablet Take 1 tablet by mouth daily, Historical      mirtazapine (REMERON) 45 MG tablet Take 45 mg by mouth nightly as needed, Historical           Allergies   No Known Allergies

## 2019-11-15 ENCOUNTER — TELEPHONE (OUTPATIENT)
Dept: UROLOGY | Facility: CLINIC | Age: 24
End: 2019-11-15

## 2019-11-15 DIAGNOSIS — N32.89 BLADDER SPASMS: Primary | ICD-10-CM

## 2019-11-15 RX ORDER — OXYBUTYNIN CHLORIDE 5 MG/1
5 TABLET, EXTENDED RELEASE ORAL DAILY
Qty: 30 TABLET | Refills: 0 | Status: ON HOLD | OUTPATIENT
Start: 2019-11-15 | End: 2019-12-26

## 2019-11-15 NOTE — TELEPHONE ENCOUNTER
Patient returned phone call and informed me that she was having quite a bit of bleeding. She has been drinking plenty of water, but also mentioned that she is having stent discomfort. Per protocol Oxybutynin was sent into patient's preferred pharmacy. Patient was also instructed to continue drinking plenty of water. If she is still having bleeding or if it worsens she should go to ER. Patient expressed understanding.     Mariah Blanco LPN

## 2019-11-15 NOTE — TELEPHONE ENCOUNTER
Patient called and left message reporting that she is having quite a bit of bleeding post-operatively. Returned phone call and LM.     Mariah Blanco LPN

## 2019-11-26 ENCOUNTER — OFFICE VISIT (OUTPATIENT)
Dept: INTERNAL MEDICINE | Facility: CLINIC | Age: 24
End: 2019-11-26
Payer: COMMERCIAL

## 2019-11-26 VITALS
HEART RATE: 108 BPM | SYSTOLIC BLOOD PRESSURE: 120 MMHG | BODY MASS INDEX: 24.12 KG/M2 | RESPIRATION RATE: 20 BRPM | HEIGHT: 64 IN | WEIGHT: 141.3 LBS | DIASTOLIC BLOOD PRESSURE: 72 MMHG | TEMPERATURE: 98.1 F | OXYGEN SATURATION: 98 %

## 2019-11-26 DIAGNOSIS — D69.6 THROMBOCYTOPENIA (H): ICD-10-CM

## 2019-11-26 DIAGNOSIS — K70.10 ALCOHOLIC HEPATITIS, UNSPECIFIED WHETHER ASCITES PRESENT (H): ICD-10-CM

## 2019-11-26 DIAGNOSIS — F10.10 ALCOHOL ABUSE: ICD-10-CM

## 2019-11-26 DIAGNOSIS — Z23 NEED FOR PROPHYLACTIC VACCINATION AND INOCULATION AGAINST INFLUENZA: Primary | ICD-10-CM

## 2019-11-26 DIAGNOSIS — K82.4 GALLBLADDER POLYP: ICD-10-CM

## 2019-11-26 DIAGNOSIS — N20.0 RENAL STONE: ICD-10-CM

## 2019-11-26 DIAGNOSIS — G47.00 INSOMNIA, UNSPECIFIED TYPE: ICD-10-CM

## 2019-11-26 PROBLEM — K75.9 HEPATITIS: Status: RESOLVED | Noted: 2019-11-12 | Resolved: 2019-11-26

## 2019-11-26 LAB
ERYTHROCYTE [DISTWIDTH] IN BLOOD BY AUTOMATED COUNT: 13.5 % (ref 10–15)
HCT VFR BLD AUTO: 40.5 % (ref 35–47)
HGB BLD-MCNC: 13.3 G/DL (ref 11.7–15.7)
MCH RBC QN AUTO: 30.8 PG (ref 26.5–33)
MCHC RBC AUTO-ENTMCNC: 32.8 G/DL (ref 31.5–36.5)
MCV RBC AUTO: 94 FL (ref 78–100)
PLATELET # BLD AUTO: 266 10E9/L (ref 150–450)
RBC # BLD AUTO: 4.32 10E12/L (ref 3.8–5.2)
WBC # BLD AUTO: 6.4 10E9/L (ref 4–11)

## 2019-11-26 PROCEDURE — 90471 IMMUNIZATION ADMIN: CPT | Performed by: INTERNAL MEDICINE

## 2019-11-26 PROCEDURE — 85027 COMPLETE CBC AUTOMATED: CPT | Performed by: INTERNAL MEDICINE

## 2019-11-26 PROCEDURE — 90686 IIV4 VACC NO PRSV 0.5 ML IM: CPT | Performed by: INTERNAL MEDICINE

## 2019-11-26 PROCEDURE — 36415 COLL VENOUS BLD VENIPUNCTURE: CPT | Performed by: INTERNAL MEDICINE

## 2019-11-26 PROCEDURE — 99204 OFFICE O/P NEW MOD 45 MIN: CPT | Mod: 25 | Performed by: INTERNAL MEDICINE

## 2019-11-26 PROCEDURE — 80053 COMPREHEN METABOLIC PANEL: CPT | Performed by: INTERNAL MEDICINE

## 2019-11-26 ASSESSMENT — MIFFLIN-ST. JEOR: SCORE: 1380.93

## 2019-11-26 NOTE — NURSING NOTE
"/72 (BP Location: Right arm, Patient Position: Sitting, Cuff Size: Adult Regular)   Pulse 108   Temp 98.1  F (36.7  C) (Oral)   Resp 20   Ht 1.626 m (5' 4\")   Wt 64.1 kg (141 lb 4.8 oz)   LMP 10/29/2019 (Exact Date)   SpO2 98%   Breastfeeding No   BMI 24.25 kg/m    Bia Solis CMA    "

## 2019-11-26 NOTE — PROGRESS NOTES
Subjective     Brenda Blount is a 24 year old female who presents to clinic today for the following health issues:    HPI       Hospital Follow-up Visit:    Hospital/Nursing Home/IP Rehab Facility: [facility]  Date of Admission: 11/12/19  Date of Discharge: 11/13/19  Reason(s) for Admission:   1.) Ureteral stone  2.) Alcohol related hepatitis        Hospital course:  This is a 24-year-old female went to ER on 11/12/2019 with symptoms of severe right-sided flank pain that woke her from sleep.  Labs showed elevated liver enzymes,  , , repeat labs showed mild improvement in the liver enzymes, , .  Patient has been drinking vodka continuously for almost a week before the ER visit.    Patient had CT of the abdomen which showed moderate right hydronephrosis due to 0.6 cm proximal ureteral stone.  Her ultrasound showed no gallstones but 3 mm gallbladder polyp.  She was diagnosed with kidney stone and had cystoscopy and ureteral stent placed.  Patient was also discharged with oxybutynin and Flomax.  Patient does not have follow-up with urologist. Patient has been urinating frequently but the stone has not passed yet.  Pain is under good control.    Patient also agreed to drinking heavily since high school.  She drinks off and on.  Patient has gone to rehab from December 2018 to June 2019 in [place].  Patient also has significant family history of alcoholism in mother, uncle and paternal grandfather.  Denies use of recreational drugs.    Other medical conditions include insomnia and takes Remeron.  Patient uses it as needed to help with sleep.         Problems taking medications regularly:  None       Medication changes since discharge: Started on Tamsulosin and Oxybutynin       Problems adhering to non-medication therapy:  None    Summary of hospitalization:  [facility] discharge summary reviewed  Diagnostic Tests/Treatments reviewed.  Follow up needed: urology and check  labs LFT  Other Healthcare Providers Involved in Patient s Care:         Urology  Update since discharge: improved.     Post Discharge Medication Reconciliation: discharge medications reconciled and changed, per note/orders (see AVS).  Plan of care communicated with patient     Coding guidelines for this visit:  Type of Medical   Decision Making Face-to-Face Visit       within 7 Days of discharge Face-to-Face Visit        within 14 days of discharge   Moderate Complexity [id number]   High Complexity [id number]            Patient Active Problem List   Diagnosis     Gallbladder polyp     Insomnia     Alcoholic hepatitis     Alcohol abuse     Thrombocytopenia (H)     Renal stone     Past Surgical History:   Procedure Laterality Date     COMBINED CYSTOSCOPY, INSERT STENT URETER(S) Right 11/12/2019    Procedure: Cytoscopy with Right double J stent;  Surgeon: Ruiz Cruz MD;  Location:  OR       Social History     Tobacco Use     Smoking status: Never Smoker     Smokeless tobacco: Never Used   Substance Use Topics     Alcohol use: Yes     Comment: [email or web address]     Family History   Problem Relation Age of Onset     Anemia Mother      Depression Mother      Alcoholism Mother      No Known Problems Maternal Grandmother      Alcoholism Maternal Grandfather      No Known Problems Paternal Grandmother      No Known Problems Paternal Grandfather      Alcoholism Brother      No Known Problems Paternal Half-Brother      No Known Problems Paternal Half-Sister          Current Outpatient Medications   Medication Sig Dispense Refill     levonorgest-eth estrad 91-Day (CAMRESE) 0.15-0.03 &0.01 MG tablet Take 1 tablet by mouth daily       mirtazapine (REMERON) 45 MG tablet Take 45 mg by mouth nightly as needed       oxybutynin ER (DITROPAN-XL) 5 MG 24 hr tablet Take 1 tablet (5 mg) by mouth daily 30 tablet 0     tamsulosin (FLOMAX) 0.4 MG capsule Take 1 capsule (0.4 mg) by mouth daily 20 capsule 0     No Known Allergies    Reviewed  "and updated as needed this visit by Provider    Review of Systems   ROS COMP: Constitutional, HEENT, cardiovascular, pulmonary, gi and gu systems are negative, except as otherwise noted.  CONSTITUTIONAL: NEGATIVE for fever, chills, change in weight  INTEGUMENTARY/SKIN: NEGATIVE for worrisome rashes, moles or lesions  EYES: NEGATIVE for vision changes or irritation  ENT/MOUTH: NEGATIVE for ear, mouth and throat problems  RESP: NEGATIVE for significant cough or SOB  CV: NEGATIVE for chest pain, palpitations or peripheral edema  GI: NEGATIVE for nausea, abdominal pain, heartburn, or change in bowel habits  : NEGATIVE for frequency, dysuria, or hematuria  MUSCULOSKELETAL: NEGATIVE for significant arthralgias or myalgia  NEURO: NEGATIVE for weakness, dizziness or paresthesias  ENDOCRINE: NEGATIVE for temperature intolerance, skin/hair changes  HEME: NEGATIVE for bleeding problems  PSYCHIATRIC: NEGATIVE for changes in mood or affect      Objective    /72 (BP Location: Right arm, Patient Position: Sitting, Cuff Size: Adult Regular)   Pulse 108   Temp 98.1  F (36.7  C) (Oral)   Resp 20   Ht 1.626 m (5' 4\")   Wt 64.1 kg (141 lb 4.8 oz)   LMP 10/29/2019 (Exact Date)   SpO2 98%   Breastfeeding No   BMI 24.25 kg/m    Body mass index is 24.25 kg/m .  Physical Exam   GENERAL: healthy, alert and no distress  EYES: Eyes grossly normal to inspection, PERRL and conjunctivae and sclerae normal  HENT: ear canals and TM's normal, nose and mouth without ulcers or lesions  NECK: no adenopathy, no asymmetry, masses, or scars and thyroid normal to palpation  RESP: lungs clear to auscultation - no rales, rhonchi or wheezes  CV: regular rate and rhythm, normal S1 S2, no S3 or S4, no murmur, click or rub, no peripheral edema and peripheral pulses strong  ABDOMEN: soft, nontender, no hepatosplenomegaly, no masses and bowel sounds normal  MS: no gross musculoskeletal defects noted, no edema  SKIN: no suspicious lesions or " rashes  NEURO: Normal strength and tone, mentation intact and speech normal  PSYCH: mentation appears normal, affect normal/bright    Diagnostic Test Results:  Labs reviewed in Epic        Assessment & Plan     Brenda was seen today for hospital f/u, imm/inj and establish care.    Diagnoses and all orders for this visit:    Need for prophylactic vaccination and inoculation against influenza  -     INFLUENZA VACCINE IM > 6 MONTHS VALENT IIV4 [33288]  -     Vaccine Administration, Initial [03693]    Gallbladder polyp    Insomnia, unspecified type    Alcoholic hepatitis, unspecified whether ascites present  -     Comprehensive metabolic panel (BMP + Alb, Alk Phos, ALT, AST, Total. Bili, TP)  -     CBC with platelets    Alcohol abuse  -     Comprehensive metabolic panel (BMP + Alb, Alk Phos, ALT, AST, Total. Bili, TP)  -     CBC with platelets    Thrombocytopenia (H)  -     CBC with platelets    Renal stone      Will do blood work to check her liver enzymes and platelets again.  Hopefully her liver enzymes should be trending down.  Will refer her to urologist after the labs.  Patient did mention that she had a drink after she was discharged.  Counseled extensively about the importance of quitting alcohol use especially with significant family history and her liver enzymes being elevated.  Patient voiced the understanding.  Also explained about her 3 mm gallbladder polyp.  She will need repeat imaging in 1 year to make sure the polyp is not increasing in size.      Follow-up: 6 months or earlier as needed.    This note was partially constructed through dragon dictation software in spite of proof reading some errors may persist.    Mayte Garcia MD  [facility]

## 2019-11-26 NOTE — Clinical Note
Pt reports normal pap smear completed 7/2019 through [facility] in [place]. Negative gonorrhea/chlamydia screening done 7/2019 through [facility] as well.

## 2019-11-27 PROBLEM — D69.6 THROMBOCYTOPENIA (H): Status: RESOLVED | Noted: 2019-11-26 | Resolved: 2019-11-27

## 2019-11-27 LAB
ALBUMIN SERPL-MCNC: 3.8 G/DL (ref 3.4–5)
ALP SERPL-CCNC: 78 U/L (ref 40–150)
ALT SERPL W P-5'-P-CCNC: 67 U/L (ref 0–50)
ANION GAP SERPL CALCULATED.3IONS-SCNC: 6 MMOL/L (ref 3–14)
AST SERPL W P-5'-P-CCNC: 40 U/L (ref 0–45)
BILIRUB SERPL-MCNC: 0.5 MG/DL (ref 0.2–1.3)
BUN SERPL-MCNC: 13 MG/DL (ref 7–30)
CALCIUM SERPL-MCNC: 8.7 MG/DL (ref 8.5–10.1)
CHLORIDE SERPL-SCNC: 103 MMOL/L (ref 94–109)
CO2 SERPL-SCNC: 27 MMOL/L (ref 20–32)
CREAT SERPL-MCNC: 0.83 MG/DL (ref 0.52–1.04)
GFR SERPL CREATININE-BSD FRML MDRD: >90 ML/MIN/{1.73_M2}
GLUCOSE SERPL-MCNC: 98 MG/DL (ref 70–99)
POTASSIUM SERPL-SCNC: 3.8 MMOL/L (ref 3.4–5.3)
PROT SERPL-MCNC: 6.9 G/DL (ref 6.8–8.8)
SODIUM SERPL-SCNC: 136 MMOL/L (ref 133–144)

## 2019-12-03 ENCOUNTER — PREP FOR PROCEDURE (OUTPATIENT)
Dept: SURGERY | Facility: CLINIC | Age: 24
End: 2019-12-03

## 2019-12-03 DIAGNOSIS — N20.1 RIGHT URETERAL CALCULUS: Primary | ICD-10-CM

## 2019-12-04 DIAGNOSIS — N20.1 URETEROLITHIASIS: ICD-10-CM

## 2019-12-04 DIAGNOSIS — F51.01 PRIMARY INSOMNIA: Primary | ICD-10-CM

## 2019-12-04 NOTE — TELEPHONE ENCOUNTER
"Requested Prescriptions   Pending Prescriptions Disp Refills     mirtazapine (REMERON) 45 MG tablet Last Written Prescription Date:  historical  Last Fill Quantity: ?,  # refills: 0   Last office visit: 11/26/2019 with prescribing provider:  11/26/2019  Future Office Visit:   Next 5 appointments (look out 90 days)    Dec 11, 2019  2:20 PM CST  Pre-Op physical with Flores Berry MD  [facility] [address]  [facility] [address]  [phone]              Sig: Take 1 tablet (45 mg) by mouth nightly as needed       Atypical Antidepressants Protocol Passed - 12/4/2019 11:39 AM        Passed - Recent (12 mo) or future (30 days) visit within the authorizing provider's specialty     Patient has had an office visit with the authorizing provider or a provider within the authorizing providers department within the previous 12 mos or has a future within next 30 days. See \"Patient Info\" tab in inbasket, or \"Choose Columns\" in Meds & Orders section of the refill encounter.              Passed - Medication active on med list        Passed - Patient is age 18 or older        Passed - No active pregnancy on record        Passed - No positive pregnancy test in past 12 mos        tamsulosin (FLOMAX) 0.4 MG capsule 20 capsule 0     Sig: Take 1 capsule (0.4 mg) by mouth daily Last Written Prescription Date:  11/13/2019  Last Fill Quantity: 20 capsule,  # refills: 0   Last office visit: 11/26/2019 with prescribing provider:  11/26/2019  Future Office Visit:   Next 5 appointments (look out 90 days)    Dec 11, 2019  2:20 PM CST  Pre-Op physical with Flores Berry MD  [facility] ([facility]) [address]  [facility] [id number] [phone]              Alpha Blockers Passed - 12/4/2019 11:39 AM        Passed - Blood pressure under 140/90 in past 12 months     BP Readings from Last 3 " "Encounters:   11/26/19 120/72   11/13/19 122/79                 Passed - Recent (12 mo) or future (30 days) visit within the authorizing provider's specialty     Patient has had an office visit with the authorizing provider or a provider within the authorizing providers department within the previous 12 mos or has a future within next 30 days. See \"Patient Info\" tab in inbasket, or \"Choose Columns\" in Meds & Orders section of the refill encounter.              Passed - Patient does not have Tadalafil, Vardenafil, or Sildenafil on their medication list        Passed - Medication is active on med list        Passed - Patient is 18 years of age or older        Passed - No active pregnancy on record        Passed - No positive pregnancy test in past 12 months        "

## 2019-12-05 NOTE — TELEPHONE ENCOUNTER
Remeron and Flomax refill request.     Last OV 11/26/19     Seeing Dr Santos on Wednesday for Preop.

## 2019-12-05 NOTE — TELEPHONE ENCOUNTER
Usually the Flomax is used for only a short period of time.  She should check with Dr. Cruz to see if he wants her to continue this medication.  Please clarify the mirtazapine: This is not usually a PRN medication but it is ordered as as needed.   Please find out what the appropriate diagnosis is, as it depression, anxiety?

## 2019-12-06 RX ORDER — MIRTAZAPINE 45 MG/1
45 TABLET, FILM COATED ORAL
Qty: 30 TABLET | Refills: 3 | Status: SHIPPED | OUTPATIENT
Start: 2019-12-06 | End: 2020-07-02

## 2019-12-06 RX ORDER — TAMSULOSIN HYDROCHLORIDE 0.4 MG/1
0.4 CAPSULE ORAL DAILY
Qty: 20 CAPSULE | Refills: 0 | Status: SHIPPED | OUTPATIENT
Start: 2019-12-06 | End: 2019-12-11

## 2019-12-06 NOTE — TELEPHONE ENCOUNTER
Spoke with patient.  Informed of Dr. Blandon's message below.    She will check with Dr. Cruz re: continuing Flomax or not.    States she was given Remeron by a provider in [place] for sleep.  States she does take 1 tab before bedtime daily.    Please advise, thanks.

## 2019-12-11 ENCOUNTER — OFFICE VISIT (OUTPATIENT)
Dept: INTERNAL MEDICINE | Facility: CLINIC | Age: 24
End: 2019-12-11
Payer: COMMERCIAL

## 2019-12-11 VITALS
TEMPERATURE: 98.3 F | WEIGHT: 135 LBS | HEIGHT: 64 IN | SYSTOLIC BLOOD PRESSURE: 126 MMHG | RESPIRATION RATE: 18 BRPM | OXYGEN SATURATION: 99 % | DIASTOLIC BLOOD PRESSURE: 74 MMHG | HEART RATE: 85 BPM | BODY MASS INDEX: 23.05 KG/M2

## 2019-12-11 DIAGNOSIS — N20.1 RIGHT URETERAL CALCULUS: ICD-10-CM

## 2019-12-11 DIAGNOSIS — R10.9 RIGHT FLANK PAIN: ICD-10-CM

## 2019-12-11 DIAGNOSIS — F10.11 HISTORY OF ALCOHOL ABUSE: ICD-10-CM

## 2019-12-11 DIAGNOSIS — Z01.818 PREOP GENERAL PHYSICAL EXAM: Primary | ICD-10-CM

## 2019-12-11 LAB
ALBUMIN UR-MCNC: >=300 MG/DL
APPEARANCE UR: ABNORMAL
BACTERIA #/AREA URNS HPF: ABNORMAL /HPF
BASOPHILS # BLD AUTO: 0 10E9/L (ref 0–0.2)
BASOPHILS NFR BLD AUTO: 0.6 %
BILIRUB UR QL STRIP: ABNORMAL
COLOR UR AUTO: ABNORMAL
DIFFERENTIAL METHOD BLD: NORMAL
EOSINOPHIL # BLD AUTO: 0.1 10E9/L (ref 0–0.7)
EOSINOPHIL NFR BLD AUTO: 1.5 %
ERYTHROCYTE [DISTWIDTH] IN BLOOD BY AUTOMATED COUNT: 13.9 % (ref 10–15)
GLUCOSE UR STRIP-MCNC: NEGATIVE MG/DL
HCT VFR BLD AUTO: 42.7 % (ref 35–47)
HGB BLD-MCNC: 13.7 G/DL (ref 11.7–15.7)
HGB UR QL STRIP: ABNORMAL
INR PPP: 1.01 (ref 0.86–1.14)
KETONES UR STRIP-MCNC: 40 MG/DL
LEUKOCYTE ESTERASE UR QL STRIP: NEGATIVE
LYMPHOCYTES # BLD AUTO: 1.1 10E9/L (ref 0.8–5.3)
LYMPHOCYTES NFR BLD AUTO: 16.4 %
MCH RBC QN AUTO: 30.3 PG (ref 26.5–33)
MCHC RBC AUTO-ENTMCNC: 32.1 G/DL (ref 31.5–36.5)
MCV RBC AUTO: 95 FL (ref 78–100)
MONOCYTES # BLD AUTO: 0.8 10E9/L (ref 0–1.3)
MONOCYTES NFR BLD AUTO: 12.5 %
NEUTROPHILS # BLD AUTO: 4.6 10E9/L (ref 1.6–8.3)
NEUTROPHILS NFR BLD AUTO: 69 %
NITRATE UR QL: NEGATIVE
PH UR STRIP: 7 PH (ref 5–7)
PLATELET # BLD AUTO: 272 10E9/L (ref 150–450)
RBC # BLD AUTO: 4.52 10E12/L (ref 3.8–5.2)
RBC #/AREA URNS AUTO: >100 /HPF
SOURCE: ABNORMAL
SP GR UR STRIP: >1.03 (ref 1–1.03)
UROBILINOGEN UR STRIP-ACNC: 0.2 EU/DL (ref 0.2–1)
WBC # BLD AUTO: 6.7 10E9/L (ref 4–11)
WBC #/AREA URNS AUTO: ABNORMAL /HPF

## 2019-12-11 PROCEDURE — 80048 BASIC METABOLIC PNL TOTAL CA: CPT | Performed by: FAMILY MEDICINE

## 2019-12-11 PROCEDURE — 85025 COMPLETE CBC W/AUTO DIFF WBC: CPT | Performed by: FAMILY MEDICINE

## 2019-12-11 PROCEDURE — 99204 OFFICE O/P NEW MOD 45 MIN: CPT | Performed by: FAMILY MEDICINE

## 2019-12-11 PROCEDURE — 36415 COLL VENOUS BLD VENIPUNCTURE: CPT | Performed by: FAMILY MEDICINE

## 2019-12-11 PROCEDURE — 85610 PROTHROMBIN TIME: CPT | Performed by: FAMILY MEDICINE

## 2019-12-11 PROCEDURE — 81001 URINALYSIS AUTO W/SCOPE: CPT | Performed by: FAMILY MEDICINE

## 2019-12-11 ASSESSMENT — MIFFLIN-ST. JEOR: SCORE: 1352.36

## 2019-12-11 NOTE — LETTER
December 12, 2019      Brenda WINTER Jose Alejandro  [address]  [facility] [address]        Dear ,    We are writing to inform you of your test results.    Brenda,     Your urine result is consistent with your recent kidney stone, but it does not show a current concern for infection.     Your remaining labs are normal, including your hemoglobin, electrolytes, kidney (Creatinine), and INR.     Please contact us with any questions or concerns.       Sincerely,     Flores Berry MD    Resulted Orders   UA with Microscopic reflex to Culture   Result Value Ref Range    Color Urine Hillary     Appearance Urine Cloudy     Glucose Urine Negative NEG^Negative mg/dL    Bilirubin Urine Small (A) NEG^Negative      Comment:      This is an unconfirmed screening test result. A positive result may be false.    Ketones Urine 40 (A) NEG^Negative mg/dL    Specific Gravity Urine >1.030 1.003 - 1.035    pH Urine 7.0 5.0 - 7.0 pH    Protein Albumin Urine >=300 (A) NEG^Negative mg/dL    Urobilinogen Urine 0.2 0.2 - 1.0 EU/dL    Nitrite Urine Negative NEG^Negative    Blood Urine Large (A) NEG^Negative    Leukocyte Esterase Urine Negative NEG^Negative    Source Midstream Urine     WBC Urine 0 - 5 OTO5^0 - 5 /HPF    RBC Urine >100 (A) OTO2^O - 2 /HPF    Bacteria Urine Few (A) NEG^Negative /HPF   CBC with platelets differential   Result Value Ref Range    WBC 6.7 4.0 - 11.0 10e9/L    RBC Count 4.52 3.8 - 5.2 10e12/L    Hemoglobin 13.7 11.7 - 15.7 g/dL    Hematocrit 42.7 35.0 - 47.0 %    MCV 95 78 - 100 fl    MCH 30.3 26.5 - 33.0 pg    MCHC 32.1 31.5 - 36.5 g/dL    RDW 13.9 10.0 - 15.0 %    Platelet Count 272 150 - 450 10e9/L    % Neutrophils 69.0 %    % Lymphocytes 16.4 %    % Monocytes 12.5 %    % Eosinophils 1.5 %    % Basophils 0.6 %    Absolute Neutrophil 4.6 1.6 - 8.3 10e9/L    Absolute Lymphocytes 1.1 0.8 - 5.3 10e9/L    Absolute Monocytes 0.8 0.0 - 1.3 10e9/L    Absolute Eosinophils 0.1 0.0 - 0.7 10e9/L    Absolute  Basophils 0.0 0.0 - 0.2 10e9/L    Diff Method Automated Method    Basic metabolic panel   Result Value Ref Range    Sodium 137 133 - 144 mmol/L    Potassium 3.9 3.4 - 5.3 mmol/L    Chloride 103 94 - 109 mmol/L    Carbon Dioxide 26 20 - 32 mmol/L    Anion Gap 8 3 - 14 mmol/L    Glucose 77 70 - 99 mg/dL    Urea Nitrogen 11 7 - 30 mg/dL    Creatinine 0.60 0.52 - 1.04 mg/dL    GFR Estimate >90 >60 mL/min/[1.73_m2]      Comment:      Non  GFR Calc  Starting 12/18/2018, serum creatinine based estimated GFR (eGFR) will be   calculated using the Chronic Kidney Disease Epidemiology Collaboration   (CKD-EPI) equation.      GFR Estimate If Black >90 >60 mL/min/[1.73_m2]      Comment:       GFR Calc  Starting 12/18/2018, serum creatinine based estimated GFR (eGFR) will be   calculated using the Chronic Kidney Disease Epidemiology Collaboration   (CKD-EPI) equation.      Calcium 9.6 8.5 - 10.1 mg/dL   INR   Result Value Ref Range    INR 1.01 0.86 - 1.14       If you have any questions or concerns, please call the clinic at the number listed above.       Sincerely,        Flores Berry MD

## 2019-12-11 NOTE — NURSING NOTE
"Chief Complaint   Patient presents with     Pre-Op Exam     initial /74   Pulse 85   Temp 98.3  F (36.8  C) (Oral)   Resp 18   Ht 1.626 m (5' 4\")   Wt 61.2 kg (135 lb)   LMP 12/06/2019 (Approximate)   SpO2 99%   Breastfeeding No   BMI 23.17 kg/m   Estimated body mass index is 23.17 kg/m  as calculated from the following:    Height as of this encounter: 1.626 m (5' 4\").    Weight as of this encounter: 61.2 kg (135 lb)..  bp completed using cuff size regular  MONICA MOLINA LPN  "

## 2019-12-11 NOTE — PATIENT INSTRUCTIONS
Before Your Surgery      Call your surgeon if there is any change in your health. This includes signs of a cold or flu (such as a sore throat, runny nose, cough, rash or fever).    Do not smoke, drink alcohol or take over the counter medicine (unless your surgeon or primary care doctor tells you to) for the 24 hours before and after surgery.    If you take prescribed drugs: Follow your doctor s orders about which medicines to take and which to stop until after surgery.  o Avoid NSAID's (e.g. Ibuprofen, Aleve), Aspirin, and Fish Oil prior to your surgery, as discussed.    Eating and drinking prior to surgery: follow the instructions from your surgeon    Take a shower or bath the night before surgery. Use the soap your surgeon gave you to gently clean your skin. If you do not have soap from your surgeon, use your regular soap. Do not shave or scrub the surgery site.  Wear clean pajamas and have clean sheets on your bed.

## 2019-12-11 NOTE — PROGRESS NOTES
[place] [address]  [facility] [address]  [phone]  Dept: [phone]    PRE-OP EVALUATION:  Today's date: 2019    Brenda Blount (: 1995) presents for pre-operative evaluation assessment, as requested by Dr. Gay.  She requires evaluation and anesthesia risk assessment prior to undergoing surgery/procedure for treatment of kidney stone.    Fax number for surgical facility:   Primary Physician: No Ref-Primary, Physician  Type of Anesthesia Anticipated: General    Patient has a Health Care Directive or Living Will:  NO    Preop Questions 2019   Who is doing your surgery? Dr. Gay   What are you having done? Cystoscopy, Ureteroscopy with Holmium Laser Lithotripsy, and Right Ureteral Stent Removal   Date of Surgery/Procedure: 19   Facility or Hospital where procedure/surgery will be performed: [facility]   1.  Do you have a history of Heart attack, stroke, stent, coronary bypass surgery, or other heart surgery? No   2.  Do you ever have any pain or discomfort in your chest? No   3.  Do you have a history of  Heart Failure? No   4.   Are you troubled by shortness of breath when:  walking on a level surface, or up a slight hill, or at night? No   5.  Do you currently have a cold, bronchitis or other respiratory infection? No   6.  Do you have a cough, shortness of breath, or wheezing? No   7.  Do you sometimes get pains in the calves of your legs when you walk? No   8. Do you or anyone in your family have previous history of blood clots? No   9.  Do you or does anyone in your family have a serious bleeding problem such as prolonged bleeding following surgeries or cuts? No   10. Have you ever had problems with anemia or been told to take iron pills? YES - History of anemia in 2019.  Follow-up CBC was 2019.   11. Have you had any abnormal blood loss such as black, tarry or bloody stools, or abnormal vaginal bleeding? No   12. Have you ever  "had a blood transfusion? No   13. Have you or any of your relatives ever had problems with anesthesia? No   14. Do you have sleep apnea, excessive snoring or daytime drowsiness? No   15. Do you have any prosthetic heart valves? No   16. Do you have prosthetic joints? No   17. Is there any chance that you may be pregnant? No - LMP 12/6/2019       HPI:     HPI related to upcoming procedure: The patient is a 24-year-old female, with a recent history of a right ureteral calculus.  Patient presents for a preoperative physical prior to cystoscopy, ureteroscopy with holmium laser lithotripsy, and right ureteral stent removal on 12/26/2019.  Patient is post cystoscopy with right double-J stent (right urethra) 11/12/2019.      Right flank pain has recurred the past 2 days, post discontinuation of Flomax.  Patient continues on Ditropan, as before.  Right flank pain is 3/10 severity (\"tolerable\"), without radiation noted.  Pain is stable/unchanged the past 2 days.  Patient denies fever, chills, nausea, or vomiting.  Hematuria continues (noted to be stable), without dysuria or urinary frequency noted.  Patient denies concern for pregnancy.  Patient is on an OCP, with LMP 12/6/2019.    Patient has a history of alcohol abuse, previously in treatment.  Patient has cut back to 3 drinks/week, which she mostly consumes on the weekends.  Recent ALT was slightly elevated (67) 11/26/2019, but the remainder the patient's CMP was within normal limits.    MEDICAL HISTORY:     Patient Active Problem List    Diagnosis Date Noted     Right ureteral calculus 12/04/2019     Priority: Medium     Added automatically from request for surgery [id number]       Insomnia      Priority: Medium     remeron       Alcoholic hepatitis      Priority: Medium     Alcohol abuse      Priority: Medium     off and on       Gallbladder polyp 11/01/2019     Priority: Medium     3mm- 11/2019, rec repeat US in 1 year       Renal stone 11/01/2019     Priority: Medium     " s/p stent        Past Medical History:   Diagnosis Date     Alcohol abuse     off and on     Alcoholic hepatitis      Gallbladder polyp 11/2019    3mm- 11/2019, rec repeat US in 1 year     Insomnia     remeron     Renal stone 11/2019    s/p stent     Past Surgical History:   Procedure Laterality Date     COMBINED CYSTOSCOPY, INSERT STENT URETER(S) Right 11/12/2019    Procedure: Cytoscopy with Right double J stent;  Surgeon: Ruiz Cruz MD;  Location: [facility] OR     Current Outpatient Medications   Medication Sig Dispense Refill     levonorgest-eth estrad 91-Day (CAMRESE) 0.15-0.03 &0.01 MG tablet Take 1 tablet by mouth daily       mirtazapine (REMERON) 45 MG tablet Take 1 tablet (45 mg) by mouth nightly as needed 30 tablet 3     oxybutynin ER (DITROPAN-XL) 5 MG 24 hr tablet Take 1 tablet (5 mg) by mouth daily 30 tablet 0     OTC products:  Patient has been avoiding NSAID's and Aspirin (per surgery recommendations), as last taken ~2 weeks ago.    No Known Allergies     Latex Allergy: NO    Social History     Tobacco Use     Smoking status: Never Smoker     Smokeless tobacco: Never Used   Substance Use Topics     Alcohol use: Yes     Comment: vodka     History   Drug Use Unknown       REVIEW OF SYSTEMS:   CONSTITUTIONAL: NEGATIVE for fever, chills, change in weight  INTEGUMENTARY/SKIN: NEGATIVE for worrisome rashes, moles or lesions  EYES: NEGATIVE for vision changes or irritation  ENT/MOUTH: NEGATIVE for ear, mouth and throat problems  RESP: NEGATIVE for significant cough or shortness of breath   BREAST: NEGATIVE for masses, tenderness or discharge  CV: NEGATIVE for chest pain, palpitations or peripheral edema  GI: NEGATIVE for nausea, abdominal pain, heartburn, or change in bowel habits  : See HPI.  NEGATIVE for frequency, and dysuria  MUSCULOSKELETAL: Right flank pain.  NEGATIVE for other significant arthralgias or myalgia  NEURO: NEGATIVE for weakness, dizziness or paresthesias  ENDOCRINE: NEGATIVE for  "temperature intolerance, skin/hair changes  HEME: NEGATIVE for bleeding problems  PSYCHIATRIC: NEGATIVE for changes in mood or affect    EXAM:   /74   Pulse 85   Temp 98.3  F (36.8  C) (Oral)   Resp 18   Ht 1.626 m (5' 4\")   Wt 61.2 kg (135 lb)   LMP 12/06/2019 (Approximate)   SpO2 99%   Breastfeeding No   BMI 23.17 kg/m      GENERAL APPEARANCE: Healthy, alert and no distress.  Patient is working on videos on her phone, in no acute distress.  Patient gets on and off the exam table without difficulty.     EYES: EOMI, PERRL     HENT: ear canals and TM's normal and nose and mouth without ulcers or lesions     NECK: no adenopathy, no asymmetry, masses, or scars and thyroid normal to palpation     RESP: lungs clear to auscultation - no rales, rhonchi or wheezes     CV: regular rates and rhythm, normal S1 S2, no S3 or S4 and no murmur, click or rub     ABDOMEN:  Soft, no HSM or masses and bowel sounds normal.  Mild tenderness to palpation noted involving the RLQ.  No rebound tenderness or guarding.  Abdomen is otherwise not tender to palpation.  Pulse sounds are intact.     BACK:  No CVA tenderness.     MS: extremities normal- no gross deformities noted, no evidence of inflammation in joints, FROM in all extremities.     SKIN: no suspicious lesions or rashes     NEURO: Normal strength and tone, sensory exam grossly normal, mentation intact and speech normal     PSYCH: mentation appears normal. and affect normal/bright     LYMPHATICS: No cervical adenopathy    DIAGNOSTICS:     EKG:  Discussed with and declined by patient.    Recent Labs   Lab Test 11/26/19  1520 11/13/19  0752   HGB 13.3 12.9    139*    141   POTASSIUM 3.8 4.3   CR 0.83 0.69      LABORATORY:  Office Visit on 12/11/2019   Component Date Value Ref Range Status     Color Urine 12/11/2019 Hillary   Final     Appearance Urine 12/11/2019 Cloudy   Final     Glucose Urine 12/11/2019 Negative  NEG^Negative mg/dL Final     Bilirubin Urine " 12/11/2019 Small* NEG^Negative Final    This is an unconfirmed screening test result. A positive result may be false.     Ketones Urine 12/11/2019 40* NEG^Negative mg/dL Final     Specific Gravity Urine 12/11/2019 >1.030  1.003 - 1.035 Final     pH Urine 12/11/2019 7.0  5.0 - 7.0 pH Final     Protein Albumin Urine 12/11/2019 >=300* NEG^Negative mg/dL Final     Urobilinogen Urine 12/11/2019 0.2  0.2 - 1.0 EU/dL Final     Nitrite Urine 12/11/2019 Negative  NEG^Negative Final     Blood Urine 12/11/2019 Large* NEG^Negative Final     Leukocyte Esterase Urine 12/11/2019 Negative  NEG^Negative Final     Source 12/11/2019 Midstream Urine   Final     WBC Urine 12/11/2019 0 - 5  OTO5^0 - 5 /HPF Final     RBC Urine 12/11/2019 >100* OTO2^O - 2 /HPF Final     Bacteria Urine 12/11/2019 Few* NEG^Negative /HPF Final     WBC 12/11/2019 6.7  4.0 - 11.0 10e9/L Final     RBC Count 12/11/2019 4.52  3.8 - 5.2 10e12/L Final     Hemoglobin 12/11/2019 13.7  11.7 - 15.7 g/dL Final     Hematocrit 12/11/2019 42.7  35.0 - 47.0 % Final     MCV 12/11/2019 95  78 - 100 fl Final     MCH 12/11/2019 30.3  26.5 - 33.0 pg Final     MCHC 12/11/2019 32.1  31.5 - 36.5 g/dL Final     RDW 12/11/2019 13.9  10.0 - 15.0 % Final     Platelet Count 12/11/2019 272  150 - 450 10e9/L Final     % Neutrophils 12/11/2019 69.0  % Final     % Lymphocytes 12/11/2019 16.4  % Final     % Monocytes 12/11/2019 12.5  % Final     % Eosinophils 12/11/2019 1.5  % Final     % Basophils 12/11/2019 0.6  % Final     Absolute Neutrophil 12/11/2019 4.6  1.6 - 8.3 10e9/L Final     Absolute Lymphocytes 12/11/2019 1.1  0.8 - 5.3 10e9/L Final     Absolute Monocytes 12/11/2019 0.8  0.0 - 1.3 10e9/L Final     Absolute Eosinophils 12/11/2019 0.1  0.0 - 0.7 10e9/L Final     Absolute Basophils 12/11/2019 0.0  0.0 - 0.2 10e9/L Final     Diff Method 12/11/2019 Automated Method   Final     Sodium 12/11/2019 137  133 - 144 mmol/L Final     Potassium 12/11/2019 3.9  3.4 - 5.3 mmol/L Final      Chloride 12/11/2019 103  94 - 109 mmol/L Final     Carbon Dioxide 12/11/2019 26  20 - 32 mmol/L Final     Anion Gap 12/11/2019 8  3 - 14 mmol/L Final     Glucose 12/11/2019 77  70 - 99 mg/dL Final     Urea Nitrogen 12/11/2019 11  7 - 30 mg/dL Final     Creatinine 12/11/2019 0.60  0.52 - 1.04 mg/dL Final     GFR Estimate 12/11/2019 >90  >60 mL/min/[1.73_m2] Final    Comment: Non  GFR Calc  Starting 12/18/2018, serum creatinine based estimated GFR (eGFR) will be   calculated using the Chronic Kidney Disease Epidemiology Collaboration   (CKD-EPI) equation.       GFR Estimate If Black 12/11/2019 >90  >60 mL/min/[1.73_m2] Final    Comment:  GFR Calc  Starting 12/18/2018, serum creatinine based estimated GFR (eGFR) will be   calculated using the Chronic Kidney Disease Epidemiology Collaboration   (CKD-EPI) equation.       Calcium 12/11/2019 9.6  8.5 - 10.1 mg/dL Final     INR 12/11/2019 1.01  0.86 - 1.14 Final        IMPRESSION:   Reason for surgery/procedure: Cystoscopy, Ureteroscopy with Holmium Laser Lithotripsy, and Right Ureteral Stent Removal  Diagnosis/reason for consult: Preoperative Physical    The proposed surgical procedure is considered INTERMEDIATE risk.    REVISED CARDIAC RISK INDEX  The patient has the following serious cardiovascular risks for perioperative complications such as (MI, PE, VFib and 3  AV Block):  No serious cardiac risks  INTERPRETATION: 0 risks: Class I (very low risk - 0.4% complication rate)    The patient has the following additional risks for perioperative complications:  No identified additional risks  History of alcohol abuse, with possible risk of withdrawal      ICD-10-CM    1. Preop general physical exam.  Patient performs 4 METs exercise without symptoms. Z01.818    2. Right ureteral calculus, post recent stent placement.  See #3. N20.1 UA with Microscopic reflex to Culture     CBC with platelets differential     Basic metabolic panel   3. Right  flank pain x 2 days, likely secondary to #2.  Exam and CBC are benign today, without current concern for UTI or pyelonephritis.  R10.9    4. History of alcohol abuse. F10.11 INR       RECOMMENDATIONS:     --Patient is to take all scheduled medications on the day of surgery EXCEPT for modifications listed below.    --Avoid NSAID's (e.g. Ibuprofen, Aleve), Aspirin, and Fish Oil prior to surgery, as discussed.    Patient should be of reasonable risk to proceed with proposed procedure, but would verify that the patient's Urine Pregnancy Test is negative prior to proceeding with surgery.    Patient agrees to follow up in the ER with any severe/worsening back or abdominal pain prior to her surgery, as discussed.     Signed Electronically by: Flores Berry MD    Copy of this evaluation report is provided to requesting physician.    [facility] Preop Guidelines    Revised Cardiac Risk Index

## 2019-12-12 LAB
ANION GAP SERPL CALCULATED.3IONS-SCNC: 8 MMOL/L (ref 3–14)
BUN SERPL-MCNC: 11 MG/DL (ref 7–30)
CALCIUM SERPL-MCNC: 9.6 MG/DL (ref 8.5–10.1)
CHLORIDE SERPL-SCNC: 103 MMOL/L (ref 94–109)
CO2 SERPL-SCNC: 26 MMOL/L (ref 20–32)
CREAT SERPL-MCNC: 0.6 MG/DL (ref 0.52–1.04)
GFR SERPL CREATININE-BSD FRML MDRD: >90 ML/MIN/{1.73_M2}
GLUCOSE SERPL-MCNC: 77 MG/DL (ref 70–99)
POTASSIUM SERPL-SCNC: 3.9 MMOL/L (ref 3.4–5.3)
SODIUM SERPL-SCNC: 137 MMOL/L (ref 133–144)

## 2019-12-26 ENCOUNTER — HOSPITAL ENCOUNTER (OUTPATIENT)
Facility: CLINIC | Age: 24
Discharge: HOME | End: 2019-12-26
Attending: UROLOGY | Admitting: UROLOGY
Payer: COMMERCIAL

## 2019-12-26 ENCOUNTER — ANESTHESIA EVENT (OUTPATIENT)
Dept: SURGERY | Facility: CLINIC | Age: 24
End: 2019-12-26
Payer: COMMERCIAL

## 2019-12-26 ENCOUNTER — ANESTHESIA (OUTPATIENT)
Dept: SURGERY | Facility: CLINIC | Age: 24
End: 2019-12-26
Payer: COMMERCIAL

## 2019-12-26 ENCOUNTER — APPOINTMENT (OUTPATIENT)
Dept: GENERAL RADIOLOGY | Facility: CLINIC | Age: 24
End: 2019-12-26
Attending: UROLOGY
Payer: COMMERCIAL

## 2019-12-26 VITALS
TEMPERATURE: 99.3 F | OXYGEN SATURATION: 100 % | HEIGHT: 64 IN | HEART RATE: 101 BPM | BODY MASS INDEX: 23.6 KG/M2 | RESPIRATION RATE: 16 BRPM | DIASTOLIC BLOOD PRESSURE: 90 MMHG | WEIGHT: 138.2 LBS | SYSTOLIC BLOOD PRESSURE: 131 MMHG

## 2019-12-26 DIAGNOSIS — N20.1 RIGHT URETERAL CALCULUS: ICD-10-CM

## 2019-12-26 LAB
COPATH REPORT: NORMAL
HCG UR QL: NEGATIVE

## 2019-12-26 PROCEDURE — 25000128 H RX IP 250 OP 636: Performed by: NURSE ANESTHETIST, CERTIFIED REGISTERED

## 2019-12-26 PROCEDURE — 88300 SURGICAL PATH GROSS: CPT | Performed by: UROLOGY

## 2019-12-26 PROCEDURE — 71000027 ZZH RECOVERY PHASE 2 EACH 15 MINS: Performed by: UROLOGY

## 2019-12-26 PROCEDURE — 25000125 ZZHC RX 250: Performed by: NURSE ANESTHETIST, CERTIFIED REGISTERED

## 2019-12-26 PROCEDURE — 36000060 ZZH SURGERY LEVEL 3 W FLUORO 1ST 30 MIN: Performed by: UROLOGY

## 2019-12-26 PROCEDURE — 81025 URINE PREGNANCY TEST: CPT | Performed by: ANESTHESIOLOGY

## 2019-12-26 PROCEDURE — 71000012 ZZH RECOVERY PHASE 1 LEVEL 1 FIRST HR: Performed by: UROLOGY

## 2019-12-26 PROCEDURE — 52353 CYSTOURETERO W/LITHOTRIPSY: CPT | Mod: RT | Performed by: UROLOGY

## 2019-12-26 PROCEDURE — 25800025 ZZH RX 258: Performed by: UROLOGY

## 2019-12-26 PROCEDURE — 40000306 ZZH STATISTIC PRE PROC ASSESS II: Performed by: UROLOGY

## 2019-12-26 PROCEDURE — 36000058 ZZH SURGERY LEVEL 3 EA 15 ADDTL MIN: Performed by: UROLOGY

## 2019-12-26 PROCEDURE — 27210794 ZZH OR GENERAL SUPPLY STERILE: Performed by: UROLOGY

## 2019-12-26 PROCEDURE — 37000009 ZZH ANESTHESIA TECHNICAL FEE, EACH ADDTL 15 MIN: Performed by: UROLOGY

## 2019-12-26 PROCEDURE — 88300 SURGICAL PATH GROSS: CPT | Mod: 26 | Performed by: UROLOGY

## 2019-12-26 PROCEDURE — 25000128 H RX IP 250 OP 636: Performed by: UROLOGY

## 2019-12-26 PROCEDURE — 25000132 ZZH RX MED GY IP 250 OP 250 PS 637: Performed by: ANESTHESIOLOGY

## 2019-12-26 PROCEDURE — 40000277 XR SURGERY CARM FLUORO LESS THAN 5 MIN W STILLS: Mod: TC

## 2019-12-26 PROCEDURE — 37000008 ZZH ANESTHESIA TECHNICAL FEE, 1ST 30 MIN: Performed by: UROLOGY

## 2019-12-26 PROCEDURE — 25800030 ZZH RX IP 258 OP 636: Performed by: ANESTHESIOLOGY

## 2019-12-26 PROCEDURE — 82365 CALCULUS SPECTROSCOPY: CPT | Performed by: UROLOGY

## 2019-12-26 PROCEDURE — C1769 GUIDE WIRE: HCPCS | Performed by: UROLOGY

## 2019-12-26 RX ORDER — GLYCOPYRROLATE 0.2 MG/ML
INJECTION, SOLUTION INTRAMUSCULAR; INTRAVENOUS PRN
Status: DISCONTINUED | OUTPATIENT
Start: 2019-12-26 | End: 2019-12-26

## 2019-12-26 RX ORDER — KETOROLAC TROMETHAMINE 30 MG/ML
INJECTION, SOLUTION INTRAMUSCULAR; INTRAVENOUS PRN
Status: DISCONTINUED | OUTPATIENT
Start: 2019-12-26 | End: 2019-12-26

## 2019-12-26 RX ORDER — CEFAZOLIN SODIUM 1 G/3ML
1 INJECTION, POWDER, FOR SOLUTION INTRAMUSCULAR; INTRAVENOUS SEE ADMIN INSTRUCTIONS
Status: DISCONTINUED | OUTPATIENT
Start: 2019-12-26 | End: 2019-12-26 | Stop reason: HOSPADM

## 2019-12-26 RX ORDER — LIDOCAINE 40 MG/G
CREAM TOPICAL
Status: DISCONTINUED | OUTPATIENT
Start: 2019-12-26 | End: 2019-12-26 | Stop reason: HOSPADM

## 2019-12-26 RX ORDER — PROPOFOL 10 MG/ML
INJECTION, EMULSION INTRAVENOUS PRN
Status: DISCONTINUED | OUTPATIENT
Start: 2019-12-26 | End: 2019-12-26

## 2019-12-26 RX ORDER — ACETAMINOPHEN 325 MG/1
975 TABLET ORAL ONCE
Status: CANCELLED | OUTPATIENT
Start: 2019-12-26 | End: 2019-12-26

## 2019-12-26 RX ORDER — NALOXONE HYDROCHLORIDE 0.4 MG/ML
.1-.4 INJECTION, SOLUTION INTRAMUSCULAR; INTRAVENOUS; SUBCUTANEOUS
Status: CANCELLED | OUTPATIENT
Start: 2019-12-26 | End: 2019-12-27

## 2019-12-26 RX ORDER — FENTANYL CITRATE 50 UG/ML
25-50 INJECTION, SOLUTION INTRAMUSCULAR; INTRAVENOUS
Status: CANCELLED | OUTPATIENT
Start: 2019-12-26

## 2019-12-26 RX ORDER — CEFAZOLIN SODIUM 2 G/100ML
2 INJECTION, SOLUTION INTRAVENOUS
Status: COMPLETED | OUTPATIENT
Start: 2019-12-26 | End: 2019-12-26

## 2019-12-26 RX ORDER — LIDOCAINE HYDROCHLORIDE 10 MG/ML
INJECTION, SOLUTION INFILTRATION; PERINEURAL PRN
Status: DISCONTINUED | OUTPATIENT
Start: 2019-12-26 | End: 2019-12-26

## 2019-12-26 RX ORDER — FENTANYL CITRATE 50 UG/ML
INJECTION, SOLUTION INTRAMUSCULAR; INTRAVENOUS PRN
Status: DISCONTINUED | OUTPATIENT
Start: 2019-12-26 | End: 2019-12-26

## 2019-12-26 RX ORDER — SODIUM CHLORIDE, SODIUM LACTATE, POTASSIUM CHLORIDE, CALCIUM CHLORIDE 600; 310; 30; 20 MG/100ML; MG/100ML; MG/100ML; MG/100ML
INJECTION, SOLUTION INTRAVENOUS CONTINUOUS
Status: CANCELLED | OUTPATIENT
Start: 2019-12-26

## 2019-12-26 RX ORDER — SULFAMETHOXAZOLE/TRIMETHOPRIM 800-160 MG
1 TABLET ORAL EVERY 12 HOURS
Qty: 3 TABLET | Refills: 0 | Status: SHIPPED | OUTPATIENT
Start: 2019-12-26 | End: 2020-07-02

## 2019-12-26 RX ORDER — ONDANSETRON 2 MG/ML
4 INJECTION INTRAMUSCULAR; INTRAVENOUS EVERY 30 MIN PRN
Status: CANCELLED | OUTPATIENT
Start: 2019-12-26

## 2019-12-26 RX ORDER — SODIUM CHLORIDE, SODIUM LACTATE, POTASSIUM CHLORIDE, CALCIUM CHLORIDE 600; 310; 30; 20 MG/100ML; MG/100ML; MG/100ML; MG/100ML
INJECTION, SOLUTION INTRAVENOUS CONTINUOUS
Status: DISCONTINUED | OUTPATIENT
Start: 2019-12-26 | End: 2019-12-26 | Stop reason: HOSPADM

## 2019-12-26 RX ORDER — ONDANSETRON 4 MG/1
4 TABLET, ORALLY DISINTEGRATING ORAL EVERY 30 MIN PRN
Status: CANCELLED | OUTPATIENT
Start: 2019-12-26

## 2019-12-26 RX ORDER — ONDANSETRON 2 MG/ML
INJECTION INTRAMUSCULAR; INTRAVENOUS PRN
Status: DISCONTINUED | OUTPATIENT
Start: 2019-12-26 | End: 2019-12-26

## 2019-12-26 RX ORDER — LABETALOL HYDROCHLORIDE 5 MG/ML
10 INJECTION, SOLUTION INTRAVENOUS
Status: CANCELLED | OUTPATIENT
Start: 2019-12-26

## 2019-12-26 RX ORDER — DEXAMETHASONE SODIUM PHOSPHATE 4 MG/ML
INJECTION, SOLUTION INTRA-ARTICULAR; INTRALESIONAL; INTRAMUSCULAR; INTRAVENOUS; SOFT TISSUE PRN
Status: DISCONTINUED | OUTPATIENT
Start: 2019-12-26 | End: 2019-12-26

## 2019-12-26 RX ORDER — HYDROMORPHONE HYDROCHLORIDE 1 MG/ML
.3-.5 INJECTION, SOLUTION INTRAMUSCULAR; INTRAVENOUS; SUBCUTANEOUS EVERY 10 MIN PRN
Status: CANCELLED | OUTPATIENT
Start: 2019-12-26

## 2019-12-26 RX ORDER — MEPERIDINE HYDROCHLORIDE 25 MG/ML
12.5 INJECTION INTRAMUSCULAR; INTRAVENOUS; SUBCUTANEOUS
Status: CANCELLED | OUTPATIENT
Start: 2019-12-26

## 2019-12-26 RX ORDER — HYDROCODONE BITARTRATE AND ACETAMINOPHEN 5; 325 MG/1; MG/1
1 TABLET ORAL ONCE
Status: COMPLETED | OUTPATIENT
Start: 2019-12-26 | End: 2019-12-26

## 2019-12-26 RX ADMIN — ONDANSETRON HYDROCHLORIDE 4 MG: 2 INJECTION, SOLUTION INTRAVENOUS at 14:18

## 2019-12-26 RX ADMIN — LIDOCAINE HYDROCHLORIDE 30 MG: 10 INJECTION, SOLUTION INFILTRATION; PERINEURAL at 14:09

## 2019-12-26 RX ADMIN — FENTANYL CITRATE 50 MCG: 50 INJECTION, SOLUTION INTRAMUSCULAR; INTRAVENOUS at 14:06

## 2019-12-26 RX ADMIN — SODIUM CHLORIDE, POTASSIUM CHLORIDE, SODIUM LACTATE AND CALCIUM CHLORIDE: 600; 310; 30; 20 INJECTION, SOLUTION INTRAVENOUS at 14:02

## 2019-12-26 RX ADMIN — PROPOFOL 200 MG: 10 INJECTION, EMULSION INTRAVENOUS at 14:09

## 2019-12-26 RX ADMIN — GLYCOPYRROLATE 0.2 MG: 0.2 INJECTION, SOLUTION INTRAMUSCULAR; INTRAVENOUS at 14:09

## 2019-12-26 RX ADMIN — FENTANYL CITRATE 50 MCG: 50 INJECTION, SOLUTION INTRAMUSCULAR; INTRAVENOUS at 14:28

## 2019-12-26 RX ADMIN — MIDAZOLAM 2 MG: 1 INJECTION INTRAMUSCULAR; INTRAVENOUS at 14:03

## 2019-12-26 RX ADMIN — DEXAMETHASONE SODIUM PHOSPHATE 4 MG: 4 INJECTION, SOLUTION INTRA-ARTICULAR; INTRALESIONAL; INTRAMUSCULAR; INTRAVENOUS; SOFT TISSUE at 14:09

## 2019-12-26 RX ADMIN — HYDROCODONE BITARTRATE AND ACETAMINOPHEN 1 TABLET: 5; 325 TABLET ORAL at 16:25

## 2019-12-26 RX ADMIN — KETOROLAC TROMETHAMINE 30 MG: 30 INJECTION, SOLUTION INTRAMUSCULAR at 14:44

## 2019-12-26 RX ADMIN — CEFAZOLIN SODIUM 2 G: 2 INJECTION, SOLUTION INTRAVENOUS at 14:02

## 2019-12-26 ASSESSMENT — MIFFLIN-ST. JEOR: SCORE: 1366.87

## 2019-12-26 NOTE — ANESTHESIA POSTPROCEDURE EVALUATION
Patient: Brenda Blount    Procedure(s):  Cystoscopy, ureteroscopy with holmium laser lithotripsy and right ureteral stent removal    Diagnosis:Right ureteral calculus [N20.1]  Diagnosis Additional Information: No value filed.    Anesthesia Type:  General, LMA    Note:  Anesthesia Post Evaluation    Patient location during evaluation: PACU  Patient participation: Able to fully participate in evaluation  Level of consciousness: awake and alert  Pain management: adequate  multimodal analgesia used between 6 hours prior to anesthesia start to PACU dischargeAirway patency: patent  Cardiovascular status: acceptable  Respiratory status: acceptable  two or more mitigation strategies used for obstructive sleep apneaHydration status: acceptable  PONV: none     Anesthetic complications: None          Last vitals:  Vitals:    12/26/19 1540 12/26/19 1545 12/26/19 1601   BP: 136/89  (!) 132/93   Pulse: 103  101   Resp: 12 15 16   Temp:   97.9  F (36.6  C)   SpO2: 100% 100% 100%         Electronically Signed By: Isaac Dennis MD  December 26, 2019  4:07 PM

## 2019-12-26 NOTE — DISCHARGE INSTRUCTIONS
GENERAL ANESTHESIA OR SEDATION ADULT DISCHARGE INSTRUCTIONS   SPECIAL PRECAUTIONS FOR 24 HOURS AFTER SURGERY    IT IS NOT UNUSUAL TO FEEL LIGHT-HEADED OR FAINT, UP TO 24 HOURS AFTER SURGERY OR WHILE TAKING PAIN MEDICATION.  IF YOU HAVE THESE SYMPTOMS; SIT FOR A FEW MINUTES BEFORE STANDING AND HAVE SOMEONE ASSIST YOU WHEN YOU GET UP TO WALK OR USE THE BATHROOM.    YOU SHOULD REST AND RELAX FOR THE NEXT 24 HOURS AND YOU MUST MAKE ARRANGEMENTS TO HAVE SOMEONE STAY WITH YOU FOR AT LEAST 24 HOURS AFTER YOUR DISCHARGE.  AVOID HAZARDOUS AND STRENUOUS ACTIVITIES.  DO NOT MAKE IMPORTANT DECISIONS FOR 24 HOURS.    DO NOT DRIVE ANY VEHICLE OR OPERATE MECHANICAL EQUIPMENT FOR 24 HOURS FOLLOWING THE END OF YOUR SURGERY.  EVEN THOUGH YOU MAY FEEL NORMAL, YOUR REACTIONS MAY BE AFFECTED BY THE MEDICATION YOU HAVE RECEIVED.    DO NOT DRINK ALCOHOLIC BEVERAGES FOR 24 HOURS FOLLOWING YOUR SURGERY.    DRINK CLEAR LIQUIDS (APPLE JUICE, GINGER ALE, 7-UP, BROTH, ETC.).  PROGRESS TO YOUR REGULAR DIET AS YOU FEEL ABLE.    YOU MAY HAVE A DRY MOUTH, A SORE THROAT, MUSCLES ACHES OR TROUBLE SLEEPING.  THESE SHOULD GO AWAY AFTER 24 HOURS.    CALL YOUR DOCTOR FOR ANY OF THE FOLLOWING:  SIGNS OF INFECTION (FEVER, GROWING TENDERNESS AT THE SURGERY SITE, A LARGE AMOUNT OF DRAINAGE OR BLEEDING, SEVERE PAIN, FOUL-SMELLING DRAINAGE, REDNESS OR SWELLING.    IT HAS BEEN OVER 8 TO 10 HOURS SINCE SURGERY AND YOU ARE STILL NOT ABLE TO URINATE (PASS WATER).       CYSTOSCOPY DISCHARGE INSTRUCTIONS  [facility]  [address]  [phone]    YOU MAY GO BACK TO YOUR NORMAL DIET AND ACTIVITY, UNLESS YOUR DOCTOR TELLS YOU NOT TO.    FOR THE NEXT TWO DAYS, YOU MAY NOTICE:    SOME BLOOD IN YOUR URINE.  SOME BURNING WHEN YOU URINATE.  AN URGE TO URINATE MORE OFTEN.  BLADDER SPASMS.    THESE ARE NORMAL AFTER THE PROCEDURE.  THEY SHOULD GO AWAY AFTER A DAY OR TWO.  TO RELIEVE THESE PROBLEMS:     DRINK 6 TO 8 LARGE GLASSES OF WATER EACH  DAY (INCLUDES DRINKS AT MEALS).  THIS WILL HELP CLEAR THE URINE.    TAKE WARM BATHS TO RELIEVE PAIN AND BLADDER SPASMS.  DO NOT ADD ANYTHING TO THE BATH WATER.    YOUR DOCTOR MAY PRESCRIBE PAIN MEDICINE.  YOU MAY ALSO TAKE TYLENOL (ACETAMINOPHEN) FOR PAIN.    CALL YOUR SURGEON IF YOU HAVE:    A FEVER OVER 101 DEGREES.  CHECK YOUR TEMPERATURE UNDER YOUR TONGUE.    CHILLS.    FAILURE TO URINATE (NO URINE COMES OUT WHEN YOU TRY TO USE THE TOILET).  TRY SOAKING IN A BATHTUB FULL OF WARM WATER.  IF STILL NO URINE, CALL YOUR DOCTOR.    A LOT OF BLOOD IN THE URINE, OR BLOOD CLOTS LARGER THAN A NICKEL.      PAIN IN THE BACK OR BELLY AREA (ABDOMEN).    PAIN OR SPASMS THAT ARE NOT RELIEVED BY WARM TUB BATHS AND PAIN MEDICINE.      SEVERE PAIN, BURNING OR OTHER PROBLEMS WHILE PASSING URINE.    PAIN THAT GETS WORSE AFTER TWO DAYS.     LITHOTRIPSY DISCHARGE INSTRUCTIONS  [facility]  [address]  [phone]      Your stone(s) has been fragmented into many tiny pieces, which must now pass in your urine.  Usually this process is uneventful.  Most fragments pass in the first one or two weeks, but some may continue to pass for three months or more.  Some pain or discomfort may accompany the passage of these fragments.    To aid in the passage of fragments, drink lots of fluid.  Aim for 2 liters of water daily for the next one to two weeks. Most patients will benefit from a continued high fluid intake and urine output indefinitely, even after the fragments are gone.  This helps prevent new stone formation.    Strain your urine.  Take the stone fragments to your urologist.  They will have them analyzed to help determine the cause of your stone(s).    You should walk around and resume every day activities.  Activity may help the stone fragments pass.  You should, however, avoid sports or really strenuous exercise for about one week, or at least until there is no more blood in your urine.    You may  resume regular diet.    Call your urologist if you have:  a. Persistent severe pain not relieved by oral medications.  b. Fever (over 101 ).  c. Persistent vomiting.    See your urologist as directed.  They will need to take an x-ray to check your progress.  Take your stone fragments to your follow-up appointment.

## 2019-12-26 NOTE — OP NOTE
Procedure Date: 12/26/2019      PREOPERATIVE DIAGNOSIS:  Large proximal right ureteral calculus.      POSTOPERATIVE DIAGNOSIS:  Large proximal right ureteral calculus.      PROCEDURE:  Video cystoscopy, panendoscopy, right double-J stent removal, right ureteroscopy with holmium laser lithotripsy and stone extraction.      SURGEON:  Ruiz Cruz MD      ANESTHESIA:  General laryngeal mask.      ESTIMATED BLOOD LOSS:  Less than 5 mL.      INDICATIONS:  The patient is a 24-year-old female who came in 6 weeks ago with a large right ureteral calculus and flank pain.  She also was found to have alcoholic hepatitis without ascites at the time.  A double-J stent was placed, and she has had followup with her primary care physician.  She now presents for ureteroscopy, holmium laser lithotripsy and stone extraction.  She understands the procedure, alternatives, risks and follow-up.      DESCRIPTION OF THE PROCEDURE:  The patient was taken to cystoscopy and received 2 grams of IV Ancef.  She was placed supine on the operating table and administered a general laryngeal mask anesthetic.  She was then placed in the lithotomy position, and her genitalia were prepped and draped in a sterile fashion.  A #22 Occitan Storz cystoscope with obturator was introduced in the bladder, and residual urine was clear.  No stone was seen in the bladder.  A right double-J stent was grasped with the cup biopsy forceps and brought out to the urethral meatus.  I passed a Glidewire up the stent under fluoroscopy until it curled in the region of the right renal pelvis.  Her stone could be seen in the proximal ureter.  Her stent was removed, and her Glidewire was left as a safety wire.  I then removed the cystoscope and passed the ureteroscope into the bladder and up the right ureter without difficulty.  I passed up to the stone and basketed the stone but could not remove the stone beyond a certain point in the proximal ureter.  I cut off the basket  and left the basket in situ with the Glidewire.  I then repassed the ureteroscope alongside the basket and the Glidewire and used a 365 micron holmium laser fiber at 4 molina to break the stone into several pieces that were easily extracted and sent for stone analysis.  I repassed the ureteroscope and found no additional fragments, and I passed up beyond the ureteropelvic junction and drained the upper collecting system.  I removed the scope under direct vision after gently removing the basket and the Glidewire.  The bladder was emptied with the cystoscope sheath.  The patient received 30 mg of IV Toradol and tolerated the procedure well.  She went to recovery in stable condition and will be discharged on Septra-DS 1 every 12 hours for 3 doses.  She will follow up with me in the office in 4 weeks to review her stone analysis, and she will be referred to [facility] for metabolic stone evaluation.         KAYODE DONALD JR, MD             D: 2019   T: 2019   MT: JOHNATHON      Name:     LOY SHAH   MRN:      [id number]        Account:        [id number]   :      1995           Procedure Date: 2019      Document: [id number]       cc: Mayte Donald Jr, MD

## 2019-12-26 NOTE — ANESTHESIA CARE TRANSFER NOTE
Patient: Brenda Blount    Procedure(s):  Cystoscopy, ureteroscopy with holmium laser lithotripsy and right ureteral stent removal    Diagnosis: Right ureteral calculus [N20.1]  Diagnosis Additional Information: No value filed.    Anesthesia Type:   General, LMA     Note:  Airway :Face Mask  Patient transferred to:PACU  Comments: [email or web address] Report: Identifed the Patient, Identified the Reponsible Provider, Reviewed the pertinent medical history, Discussed the surgical course, Reviewed Intra-OP anesthesia mangement and issues during anesthesia, Set expectations for post-procedure period and Allowed opportunity for questions and acknowledgement of understanding      Vitals: (Last set prior to Anesthesia Care Transfer)    CRNA VITALS  12/26/2019 1424 - 12/26/2019 1458      12/26/2019             Pulse:  131    SpO2:  100 %    Resp Rate (observed):  10                Electronically Signed By: ALEJANDRO Miller CRNA  December 26, 2019  2:58 PM

## 2019-12-26 NOTE — ANESTHESIA PREPROCEDURE EVALUATION
Anesthesia Pre-Procedure Evaluation    Patient: Brenda Blount   MRN: [id number] : 1995          Preoperative Diagnosis: Right ureteral calculus [N20.1]    Procedure(s):  Cystoscopy, ureteroscopy with holmium laser lithotripsy and right ureteral stent removal    Past Medical History:   Diagnosis Date     Alcohol abuse     off and on     Alcoholic hepatitis      Gallbladder polyp 2019    3mm- 2019, rec repeat US in 1 year     Insomnia     remeron     Renal stone 2019    s/p stent     Past Surgical History:   Procedure Laterality Date     COMBINED CYSTOSCOPY, INSERT STENT URETER(S) Right 2019    Procedure: Cytoscopy with Right double J stent;  Surgeon: Ruiz Cruz MD;  Location: [facility] OR     Anesthesia Evaluation     . Pt has had prior anesthetic. Type: General    No history of anesthetic complications          ROS/MED HX    ENT/Pulmonary:  - neg pulmonary ROS     Neurologic:  - neg neurologic ROS     Cardiovascular:  - neg cardiovascular ROS       METS/Exercise Tolerance:     Hematologic:  - neg hematologic  ROS       Musculoskeletal:  - neg musculoskeletal ROS       GI/Hepatic:  - neg GI/hepatic ROS   (+) hepatitis type Alcoholic, liver disease,       Renal/Genitourinary:     (+) chronic renal disease, Nephrolithiasis ,       Endo:  - neg endo ROS       Psychiatric:  - neg psychiatric ROS       Infectious Disease:  - neg infectious disease ROS       Malignancy:         Other:    - neg other ROS                      Physical Exam  Normal systems: cardiovascular, pulmonary and dental    Airway   Mallampati: II  TM distance: >3 FB  Neck ROM: full    Dental     Cardiovascular       Pulmonary             Lab Results   Component Value Date    WBC 6.7 2019    HGB 13.7 2019    HCT 42.7 2019     2019     2019    POTASSIUM 3.9 2019    CHLORIDE 103 2019    CO2 26 2019    BUN 11 2019    CR 0.60 2019    GLC 77 2019     "EDER 9.6 12/11/2019    ALBUMIN 3.8 11/26/2019    PROTTOTAL 6.9 11/26/2019    ALT 67 (H) 11/26/2019    AST 40 11/26/2019    ALKPHOS 78 11/26/2019    BILITOTAL 0.5 11/26/2019    LIPASE 63 (L) 11/12/2019    INR 1.01 12/11/2019    HCGS Negative 11/12/2019       Preop Vitals  BP Readings from Last 3 Encounters:   12/26/19 (!) 127/90   12/11/19 126/74   11/26/19 120/72    Pulse Readings from Last 3 Encounters:   12/11/19 85   11/26/19 108   11/12/19 101      Resp Readings from Last 3 Encounters:   12/26/19 18   12/11/19 18   11/26/19 20    SpO2 Readings from Last 3 Encounters:   12/26/19 98%   12/11/19 99%   11/26/19 98%      Temp Readings from Last 1 Encounters:   12/26/19 99.4  F (37.4  C) (Temporal)    Ht Readings from Last 1 Encounters:   12/26/19 1.626 m (5' 4\")      Wt Readings from Last 1 Encounters:   12/26/19 62.7 kg (138 lb 3.2 oz)    Estimated body mass index is 23.72 kg/m  as calculated from the following:    Height as of this encounter: 1.626 m (5' 4\").    Weight as of this encounter: 62.7 kg (138 lb 3.2 oz).       Anesthesia Plan      History & Physical Review  History and physical reviewed and following examination; no interval change.    ASA Status:  2 .    NPO Status:  > 8 hours    Plan for General and LMA with Intravenous induction. Maintenance will be Balanced.    PONV prophylaxis:  Ondansetron (or other 5HT-3) and Dexamethasone or Solumedrol       Postoperative Care  Postoperative pain management:  IV analgesics, Oral pain medications and Multi-modal analgesia.      Consents  Anesthetic plan, risks, benefits and alternatives discussed with:  Patient..                 Isaac Dennis MD                    .  "

## 2019-12-26 NOTE — BRIEF OP NOTE
Diagnosis: Large right ureteral calculus  Procedure: Video cystoscopy, right double-J stent removal, right ureteroscopy with holmium laser lithotripsy and stone extraction   Surgeon: Anthony  Anesthesia: General laryngeal mask  EBL: Less than 5 ml  Findings with a large stone in proximal right ureter-requiring holmium laser lithotripsy at 4 W

## 2019-12-27 LAB
APPEARANCE STONE: NORMAL
COMPN STONE: NORMAL
NUMBER STONE: 2
SIZE STONE: NORMAL MM
WT STONE: 27 MG

## 2020-01-01 ENCOUNTER — NURSE TRIAGE (OUTPATIENT)
Dept: NURSING | Facility: CLINIC | Age: 25
End: 2020-01-01

## 2020-01-01 NOTE — TELEPHONE ENCOUNTER
"Patient calling reporting she had a procedure done 1 week ago and the IV site started to bruise in past 2 days with spreading redness. Patient reporting new redness from \"pinky knuckle to wrist.\" Bruising is flat covering top of left hand into wrist. Afebrile.   Per guidelines advised to be seen with in 4 hours. Caller verbalized understanding. Denies further questions.    Fatoumata Waller RN  [facility]        Reason for Disposition    [1] Skin redness AND [2] extends > 1 inch (2.5 cm) from IV site    Additional Information    Negative: Severe difficulty breathing (e.g., struggling for each breath, speaks in single words)    Negative: Shock suspected (e.g., cold/pale/clammy skin, too weak to stand, low BP, rapid pulse)    Negative: Sounds like a life-threatening emergency to the triager    Negative: IV not running or running slowly    Negative: [1] Difficulty breathing AND [2] not severe    Negative: Arm is swollen, new onset (or leg swelling if IV in lower extremity)    Negative: Fever > 100.5 F (38.1 C)    Negative: Patient sounds very sick or weak to the triager    Negative: Pus or cloudy fluid from IV site    Negative: [1] Red streak at IV site AND [2] palpable cord    Negative: [1] Red streak at IV site AND [2] longer than 1 inch (2.5 cm)    Protocols used: IV SITE (SKIN) SYMPTOMS-A-AH      "

## 2020-01-24 DIAGNOSIS — N20.0 KIDNEY STONE: Primary | ICD-10-CM

## 2020-07-02 ENCOUNTER — OFFICE VISIT (OUTPATIENT)
Dept: INTERNAL MEDICINE | Facility: CLINIC | Age: 25
End: 2020-07-02
Payer: COMMERCIAL

## 2020-07-02 VITALS
OXYGEN SATURATION: 98 % | SYSTOLIC BLOOD PRESSURE: 112 MMHG | WEIGHT: 145.9 LBS | TEMPERATURE: 98.5 F | RESPIRATION RATE: 18 BRPM | HEIGHT: 64 IN | HEART RATE: 99 BPM | DIASTOLIC BLOOD PRESSURE: 80 MMHG | BODY MASS INDEX: 24.91 KG/M2

## 2020-07-02 DIAGNOSIS — R59.9 ENLARGED LYMPH NODE: Primary | ICD-10-CM

## 2020-07-02 PROCEDURE — 99213 OFFICE O/P EST LOW 20 MIN: CPT | Performed by: INTERNAL MEDICINE

## 2020-07-02 ASSESSMENT — MIFFLIN-ST. JEOR: SCORE: 1396.8

## 2020-07-02 NOTE — PROGRESS NOTES
"Subjective     Brenda Blount is a 25 year old female who presents to clinic today for the following health issues:    HPI     Complaints of tender lump in the right groin: She noticed this a week ago.  It had been stable but today feels like it is slightly less tender and slightly smaller.  There has been some erythema of the skin of the perineal area, she does shave the skin.    No other acute symptoms.    Patient Active Problem List   Diagnosis     Gallbladder polyp     Insomnia     Alcoholic hepatitis     Alcohol abuse     Renal stone     Right ureteral calculus     No current outpatient medications on file.      Social History     Tobacco Use     Smoking status: Never Smoker     Smokeless tobacco: Never Used   Substance Use Topics     Alcohol use: Yes     Comment: vodka - once a week     Drug use: Never            Reviewed and updated as needed this visit by Provider         Review of Systems   No fever, chills      Objective    /80   Pulse 99   Temp 98.5  F (36.9  C) (Oral)   Resp 18   Ht 1.626 m (5' 4\")   Wt 66.2 kg (145 lb 14.4 oz)   LMP 06/17/2020   SpO2 98%   BMI 25.04 kg/m    Body mass index is 25.04 kg/m .  Physical Exam     There is a approximately 1-1.2 cm lymph node in the right inguinal area.  This is mildly tender, smooth, round, mobile.  No other adenopathy in the right or left inguinal area.  She does have some skin irritation of the perineal area          Assessment & Plan     1. Enlarged lymph node  Advised this is likely a reactive lymph node that probably was triggered by irritation of the skin from shaving.  Reassured her that there is no evidence of any infection and advised about the difference between an abscess and the lymph node.  It has started to improve.  No treatment necessary at this time, advised to consider alternatives to using a regular razor to shave.        Return in about 3 months (around 10/2/2020) for Physical Exam with primary.    Catherine Blandon, " MD  [facility]

## 2020-08-24 NOTE — BRIEF OP NOTE
Diagnosis: Proximal right ureteral calculus  Procedure: Video cystopanendoscopy, right double-J stent insertion (5 Pashto by 24 cm)  Surgeon: Hannahs Mill  Anesthesia: General laryngeal mask  EBL: 0 ml  Findings: Proximal ureteral calculus seen easily on fluoroscopy   [place] [address] [address]  Phone: [phone] Fax: [phone]        Physical Therapy Re-Certification Plan of Care/MD UPDATE      Dear Referring Practitioner: Galina Mcmanus,    We had the pleasure of treating the following patient for physical therapy services at [address] [place] [address]. A summary of our findings can be found in the updated assessment below. This includes our plan of care. If you have any questions or concerns regarding these findings, please do not hesitate to contact me at the office phone number checked above.   Thank you for the referral.     Physician Signature:________________________________Date:__________________  By signing above (or electronic signature), therapists plan is approved by physician    Date Range Of Visits: 2020 thru 2020  Total Visits to Date: 9  Overall Response to Treatment:   []Patient is responding well to treatment and improvement is noted with regards  to goals   []Patient should continue to improve in reasonable time if they continue HEP   []Patient has plateaued and is no longer responding to skilled PT intervention    [x]Patient is getting worse and would benefit from return to referring MD   []Patient unable to adhere to initial POC   []Other:     Physical Therapy Treatment Note/ Progress Report:     Date:  2020    Patient Name:  Terrance Rivas    :  1952  MRN: [id number]  Restrictions/Precautions:    Medical/Treatment Diagnosis Information:  Diagnosis: R chronic hip pain  Treatment Diagnosis: R chronic hip pain M25.551, N85.16  Insurance/Certification information:  PT Insurance Information: [employer]  Physician Information:  Referring Practitioner: Chema Camejo signed (Y/N):     Date of Patient follow up with Physician:      Progress Report: [x]  Yes  []  No     Date Range for reporting period:  Beginnin2020  Ending: 8/24/2020    Progress report due (10 Rx/or 30 days whichever is less): 4/64/4220     Recertification due (POC duration/ or 90 days whichever is less): 9/7/2020     Visit # Insurance Allowable Auth Needed   [address] [place], 20/yr- ended 7/31/2020  Medicare starting 8/1/2020 []Yes   [x]No     Latex Allergy:  [x]NO      []YES  Preferred Language for Healthcare:   [x]English       []other:  Functional Scale: LEFS 58.75% disability Date assessed:8/24/2020    Pain level:  0/10     SUBJECTIVE:  Patient reports that he feels hip is pretty sore today. Tried to sit on bike for about 30 sec and had increased pain in hip. States that he feels like he has better motion- but pain is not improving much.       OBJECTIVE:    Observation:    Test measurements:         ROM LEFT RIGHT   HIP Flex 125 115   HIP Abd       HIP Ext       HIP IR 30 20   HIP ER 45 40   Strength  LEFT RIGHT   HIP Flexors 29.4 16.6 (pain limiting factor)   HIP Abductors 34.9 24.6   HIP Ext 29.1 25.9       RESTRICTIONS/PRECAUTIONS: none    Exercises/Interventions:     Therapeutic Ex (66796)   Min: 0 min Sets/sec Reps Notes/CUES   Retro Stepper/BIKE 1 5    Alter G      BFR      Sportcord March      Prone glute max activation 10 10 Cues form   3 way SLR 1 15 3lb   SL hip abduction 1 15 3 lb   Clam ABD 1 15 Cues form, purple band   Reverse clamshell 1 15 3 lb   Hip Ext /table 1 15 3 lb   BOSU fwd/side lunge      BOSU squat      Leg Press Iso/Con/Ecc 0- 2 10 ecc 95#   Cybex HS curl      TKE      Glute side walks 2 2 orange   RDL      Slide Lunge      Slide HS eccentrics      Step ups/ecc step down      Swissball wall rolls- in SLS- hip drive      Quad hip ext/wall-ball rolls      SKTC stretch HEP  R   Supine hip ER stretch 30 4 R   Supine piriformis and fig 4 stretch 30 4 R   Manual Intervention (96521)  Min: 15 min      Knee mobs/PROM      Tib/Fem Mobs      PROM R hip 1 4    Prone figure 4 and anterior hip mobs 1 0    Hip belt mobs 1 8    LAD 1 3    NMR re-education (32817)  Min:   CUES NEEDED   Estonian/Biofeedback 10/10      BFR      G. Med activation      Hip Ext full ROM/ G. Activation      Bosu Bal and Prop- G Med      Single leg stance/Balance/Prop      Bosu Retro G. Med act      Prone Hip froggers- sliders/elevated            Therapeutic Activity (96244)  Min:      Ladders      Plyos      Dynamic Balance                            Therapeutic Exercise and NMR EXR  [x] (66665) Provided verbal/tactile cueing for activities related to strengthening, flexibility, endurance, ROM for improvements in LE, proximal hip, and core control with self care, mobility, lifting, ambulation. [x] (99505) Provided verbal/tactile cueing for activities related to improving balance, coordination, kinesthetic sense, posture, motor skill, proprioception  to assist with LE, proximal hip, and core control in self care, mobility, lifting, ambulation and eccentric single leg control.    NMR and Therapeutic Activities:    [x] (94895 or 62070) Provided verbal/tactile cueing for activities related to improving balance, coordination, kinesthetic sense, posture, motor skill, proprioception and motor activation to allow for proper function of core, proximal hip and LE with self care and ADLs and functional mobility.   [] (79622) Gait Re-education- Provided training and instruction to the patient for proper LE, core and proximal hip recruitment and positioning and eccentric body weight control with ambulation re-education including up and down stairs     Home Exercise Program:    [x] (20607) Reviewed/Progressed HEP activities related to strengthening, flexibility, endurance, ROM of core, proximal hip and LE for functional self-care, mobility, lifting and ambulation/stair navigation   [] (81522)Reviewed/Progressed HEP activities related to improving balance, coordination, kinesthetic sense, posture, motor skill, proprioception of core, proximal hip and LE for self care, mobility, lifting, and ambulation/stair navigation      Manual Treatments:  PROM / STM / Oscillations-Mobs:  G-I, II, III, IV (PA's, Inf., Post.)  [x] (36397) Provided manual therapy to mobilize LE, proximal hip and/or LS spine soft tissue/joints for the purpose of modulating pain, promoting relaxation,  increasing ROM, reducing/eliminating soft tissue swelling/inflammation/restriction, improving soft tissue extensibility and allowing for proper ROM for normal function with self care, mobility, lifting and ambulation. Modalities:     [] GAME READY (VASO)- for significant edema, swelling, pain control. Charges:  Timed Code Treatment Minutes: 15   Total Treatment Minutes: 30      [] EVAL (LOW) 22503 (typically 20 minutes face-to-face)  [] EVAL (MOD) 74125 (typically 30 minutes face-to-face)  [] EVAL (HIGH) 82324 (typically 45 minutes face-to-face)  [] RE-EVAL     [] MICKEY(74588) x     [] DRY NEEDLE 1 OR 2 MUSCLES  [] NMR (69345) x     [] DRY NEEDLE 3+ MUSCLES  [x] Manual (35908) x  1     [] TA (08302) x     [] Mech Traction (43131)  [] ES(attended) (76523)     [] ES (un) (24557):   [] VASO (68852)  [] Other:    If [facility] Please Indicate Time In/Out  CPT Code Time in Time out                                   GOALS:  Patient stated goal: improve movement of R hip  [x]? Progressing: []? Met: []? Not Met: []? Adjusted     Therapist goals for Patient:   Short Term Goals: To be achieved in: 2 weeks  1. Independent in HEP and progression per patient tolerance, in order to prevent re-injury. []? Progressing: [x]? Met: []? Not Met: []? Adjusted  2. Patient will have a decrease in pain to facilitate improvement in movement, function, and ADLs as indicated by Functional Deficits. []? Progressing: []? Met: [x]? Not Met: []? Adjusted     Long Term Goals: To be achieved in: 6 weeks  1. Disability index score of 15% or less for the LEFS to assist with reaching prior level of function. []? Progressing: []? Met: [x]? Not Met: []? Adjusted  2. Patient will demonstrate increased AROM to 0-25 degrees R hip IR to allow for proper joint functioning as indicated by patients Functional Deficits. [x]? Progressing: []? Met: []? Not Met: []? Adjusted  3. Patient will demonstrate an increase in Strength to good proximal hip strength and control, within 5lb HHD in LE to allow for proper functional mobility as indicated by patients Functional Deficits. [x]? Progressing: []? Met: []? Not Met: []? Adjusted  4. Patient will return to standing, squatting, stair functional activities without increased symptoms or restriction. [x]? Progressing: []? Met: []? Not Met: []? Adjusted  5. Patient will report being able to ride bike for greater than 1 hr without increased symptoms or restriction. (patient specific functional goal)    []? Progressing: []? Met: [x]? Not Met: []? Adjusted    ASSESSMENT:  Patient has been seen for 9 visits of physical therapy since onset for R hip pain. Patient had been making good improvement in first 4-6 sessions; however over the past 2-3 sessions has been having increased discomfort and less tolerance for moving into ER position. Patient has made fair improvements in hip ER/IR ROM as well as minimal improvements in hip strength. Patient continues to be limited in anterior mobility of R hip and into ER/IR movements. Given limited progress, recommend patient to return to MD and have further follow up with orthopedic MD. Will hold therapy at this time with patient working IND on HEP and will follow up after patient has MD follow up.      Return to Play: (if applicable)   []  Stage 1: Intro to Strength   []  Stage 2: Return to Run and Strength   []  Stage 3: Return to Jump and Strength   []  Stage 4: Dynamic Strength and Agility   []  Stage 5: Sport Specific Training     []  Ready to Return to Play, Meets All Above Stages   []  Not Ready for Return to Sports   Comments:            Treatment/Activity Tolerance:  [x] Patient tolerated treatment well [] Patient limited by fatique  [] Patient limited by pain  [] Patient limited by other medical complications  [] Other:     Overall Progression Towards Functional goals/ Treatment Progress Update:  [x] Patient is progressing as expected towards functional goals listed. [] Progression is slowed due to complexities/Impairments listed. [] Progression has been slowed due to co-morbidities. [] Plan just implemented, too soon to assess goals progression <30days   [] Goals require adjustment due to lack of progress  [] Patient is not progressing as expected and requires additional follow up with physician  [] Other    Prognosis for POC: [x] Good [] Fair  [] Poor    Patient requires continued skilled intervention: [x] Yes  [] No        PLAN: Will hold further PT until after patient has further MD follow up  [] Continue per plan of care [] Alter current plan (see comments)  [] Plan of care initiated [x] Hold pending MD visit [] Discharge    Electronically signed by: Augustine Pena, PT    Note: If patient does not return for scheduled/recommended follow up visits, this note will serve as a discharge from care along with the most recent update on progress.

## 2022-07-21 ENCOUNTER — LAB REQUISITION (OUTPATIENT)
Dept: LAB | Facility: CLINIC | Age: 27
End: 2022-07-21

## 2022-07-21 DIAGNOSIS — Z34.91 ENCOUNTER FOR SUPERVISION OF NORMAL PREGNANCY, UNSPECIFIED, FIRST TRIMESTER: ICD-10-CM

## 2022-07-21 LAB
ABO/RH(D): NORMAL
ANTIBODY SCREEN: NEGATIVE
BASOPHILS # BLD AUTO: 0.1 10E3/UL (ref 0–0.2)
BASOPHILS NFR BLD AUTO: 1 %
EOSINOPHIL # BLD AUTO: 0.1 10E3/UL (ref 0–0.7)
EOSINOPHIL NFR BLD AUTO: 1 %
ERYTHROCYTE [DISTWIDTH] IN BLOOD BY AUTOMATED COUNT: 13.1 % (ref 10–15)
HCT VFR BLD AUTO: 40.4 % (ref 35–47)
HGB BLD-MCNC: 13 G/DL (ref 11.7–15.7)
IMM GRANULOCYTES # BLD: 0 10E3/UL
IMM GRANULOCYTES NFR BLD: 1 %
LYMPHOCYTES # BLD AUTO: 1.3 10E3/UL (ref 0.8–5.3)
LYMPHOCYTES NFR BLD AUTO: 20 %
MCH RBC QN AUTO: 30.6 PG (ref 26.5–33)
MCHC RBC AUTO-ENTMCNC: 32.2 G/DL (ref 31.5–36.5)
MCV RBC AUTO: 95 FL (ref 78–100)
MONOCYTES # BLD AUTO: 0.5 10E3/UL (ref 0–1.3)
MONOCYTES NFR BLD AUTO: 8 %
NEUTROPHILS # BLD AUTO: 4.5 10E3/UL (ref 1.6–8.3)
NEUTROPHILS NFR BLD AUTO: 69 %
NRBC # BLD AUTO: 0 10E3/UL
NRBC BLD AUTO-RTO: 0 /100
PLATELET # BLD AUTO: 196 10E3/UL (ref 150–450)
RBC # BLD AUTO: 4.25 10E6/UL (ref 3.8–5.2)
SPECIMEN EXPIRATION DATE: NORMAL
SPECIMEN EXPIRATION DATE: NORMAL
WBC # BLD AUTO: 6.4 10E3/UL (ref 4–11)

## 2022-07-21 PROCEDURE — 86803 HEPATITIS C AB TEST: CPT | Performed by: NURSE PRACTITIONER

## 2022-07-21 PROCEDURE — 85025 COMPLETE CBC W/AUTO DIFF WBC: CPT | Performed by: NURSE PRACTITIONER

## 2022-07-21 PROCEDURE — 86592 SYPHILIS TEST NON-TREP QUAL: CPT | Performed by: NURSE PRACTITIONER

## 2022-07-21 PROCEDURE — 86850 RBC ANTIBODY SCREEN: CPT | Performed by: NURSE PRACTITIONER

## 2022-07-21 PROCEDURE — 87086 URINE CULTURE/COLONY COUNT: CPT | Performed by: NURSE PRACTITIONER

## 2022-07-21 PROCEDURE — G0145 SCR C/V CYTO,THINLAYER,RESCR: HCPCS | Performed by: NURSE PRACTITIONER

## 2022-07-21 PROCEDURE — 86762 RUBELLA ANTIBODY: CPT | Performed by: NURSE PRACTITIONER

## 2022-07-21 PROCEDURE — 87340 HEPATITIS B SURFACE AG IA: CPT | Performed by: NURSE PRACTITIONER

## 2022-07-21 PROCEDURE — 87624 HPV HI-RISK TYP POOLED RSLT: CPT | Performed by: NURSE PRACTITIONER

## 2022-07-21 PROCEDURE — 87389 HIV-1 AG W/HIV-1&-2 AB AG IA: CPT | Performed by: NURSE PRACTITIONER

## 2022-07-21 PROCEDURE — 86901 BLOOD TYPING SEROLOGIC RH(D): CPT | Performed by: NURSE PRACTITIONER

## 2022-07-22 LAB
HBV SURFACE AG SERPL QL IA: NONREACTIVE
HCV AB SERPL QL IA: NONREACTIVE
HIV 1+2 AB+HIV1 P24 AG SERPL QL IA: NONREACTIVE
RPR SER QL: NONREACTIVE
RUBV IGG SERPL QL IA: 4.13 INDEX
RUBV IGG SERPL QL IA: POSITIVE

## 2022-07-23 LAB — BACTERIA UR CULT: NO GROWTH

## 2022-07-26 LAB
BKR LAB AP GYN ADEQUACY: NORMAL
BKR LAB AP GYN INTERPRETATION: NORMAL
PATH REPORT.COMMENTS IMP SPEC: NORMAL
PATH REPORT.COMMENTS IMP SPEC: NORMAL

## 2022-07-29 LAB
HUMAN PAPILLOMA VIRUS 16 DNA: NEGATIVE
HUMAN PAPILLOMA VIRUS 18 DNA: NEGATIVE
HUMAN PAPILLOMA VIRUS FINAL DIAGNOSIS: ABNORMAL
HUMAN PAPILLOMA VIRUS OTHER HR: POSITIVE

## 2022-12-15 ENCOUNTER — LAB REQUISITION (OUTPATIENT)
Dept: LAB | Facility: CLINIC | Age: 27
End: 2022-12-15

## 2022-12-15 DIAGNOSIS — Z36.89 ENCOUNTER FOR OTHER SPECIFIED ANTENATAL SCREENING: ICD-10-CM

## 2022-12-15 LAB
GLUCOSE 1H P 50 G GLC PO SERPL-MCNC: 75 MG/DL (ref 70–129)
HGB BLD-MCNC: 11.2 G/DL (ref 11.7–15.7)

## 2022-12-15 PROCEDURE — 86780 TREPONEMA PALLIDUM: CPT | Performed by: OBSTETRICS & GYNECOLOGY

## 2022-12-15 PROCEDURE — 82950 GLUCOSE TEST: CPT | Performed by: OBSTETRICS & GYNECOLOGY

## 2022-12-15 PROCEDURE — 85018 HEMOGLOBIN: CPT | Performed by: OBSTETRICS & GYNECOLOGY

## 2022-12-16 LAB — T PALLIDUM AB SER QL: NONREACTIVE

## 2023-02-09 ENCOUNTER — LAB REQUISITION (OUTPATIENT)
Dept: LAB | Facility: CLINIC | Age: 28
End: 2023-02-09

## 2023-02-09 DIAGNOSIS — Z3A.36 36 WEEKS GESTATION OF PREGNANCY: ICD-10-CM

## 2023-02-09 PROCEDURE — 87653 STREP B DNA AMP PROBE: CPT | Performed by: OBSTETRICS & GYNECOLOGY

## 2023-02-10 LAB — GP B STREP DNA SPEC QL NAA+PROBE: NEGATIVE

## 2023-03-04 ENCOUNTER — ANESTHESIA (OUTPATIENT)
Dept: OBGYN | Facility: CLINIC | Age: 28
End: 2023-03-04
Payer: COMMERCIAL

## 2023-03-04 ENCOUNTER — HOSPITAL ENCOUNTER (INPATIENT)
Facility: CLINIC | Age: 28
LOS: 1 days | Discharge: HOME | End: 2023-03-05
Attending: OBSTETRICS & GYNECOLOGY | Admitting: OBSTETRICS & GYNECOLOGY
Payer: COMMERCIAL

## 2023-03-04 ENCOUNTER — ANESTHESIA EVENT (OUTPATIENT)
Dept: OBGYN | Facility: CLINIC | Age: 28
End: 2023-03-04
Payer: COMMERCIAL

## 2023-03-04 ENCOUNTER — NURSE TRIAGE (OUTPATIENT)
Dept: NURSING | Facility: CLINIC | Age: 28
End: 2023-03-04

## 2023-03-04 LAB
ABO/RH(D): NORMAL
ALBUMIN SERPL BCG-MCNC: 3.8 G/DL (ref 3.5–5.2)
ALP SERPL-CCNC: 133 U/L (ref 35–104)
ALT SERPL W P-5'-P-CCNC: 7 U/L (ref 10–35)
ANION GAP SERPL CALCULATED.3IONS-SCNC: 16 MMOL/L (ref 7–15)
ANTIBODY SCREEN: NEGATIVE
AST SERPL W P-5'-P-CCNC: 17 U/L (ref 10–35)
BASOPHILS # BLD AUTO: 0 10E3/UL (ref 0–0.2)
BASOPHILS NFR BLD AUTO: 0 %
BILIRUB SERPL-MCNC: 0.3 MG/DL
BUN SERPL-MCNC: 10.5 MG/DL (ref 6–20)
CALCIUM SERPL-MCNC: 9.4 MG/DL (ref 8.6–10)
CHLORIDE SERPL-SCNC: 100 MMOL/L (ref 98–107)
CREAT SERPL-MCNC: 0.54 MG/DL (ref 0.51–0.95)
DEPRECATED HCO3 PLAS-SCNC: 20 MMOL/L (ref 22–29)
EOSINOPHIL # BLD AUTO: 0 10E3/UL (ref 0–0.7)
EOSINOPHIL NFR BLD AUTO: 0 %
ERYTHROCYTE [DISTWIDTH] IN BLOOD BY AUTOMATED COUNT: 14 % (ref 10–15)
GFR SERPL CREATININE-BSD FRML MDRD: >90 ML/MIN/1.73M2
GLUCOSE SERPL-MCNC: 79 MG/DL (ref 70–99)
HCT VFR BLD AUTO: 33.3 % (ref 35–47)
HGB BLD-MCNC: 10.8 G/DL (ref 11.7–15.7)
IMM GRANULOCYTES # BLD: 0.1 10E3/UL
IMM GRANULOCYTES NFR BLD: 1 %
LYMPHOCYTES # BLD AUTO: 0.9 10E3/UL (ref 0.8–5.3)
LYMPHOCYTES NFR BLD AUTO: 9 %
MCH RBC QN AUTO: 25.8 PG (ref 26.5–33)
MCHC RBC AUTO-ENTMCNC: 32.4 G/DL (ref 31.5–36.5)
MCV RBC AUTO: 80 FL (ref 78–100)
MONOCYTES # BLD AUTO: 0.6 10E3/UL (ref 0–1.3)
MONOCYTES NFR BLD AUTO: 5 %
NEUTROPHILS # BLD AUTO: 8.9 10E3/UL (ref 1.6–8.3)
NEUTROPHILS NFR BLD AUTO: 85 %
NRBC # BLD AUTO: 0 10E3/UL
NRBC BLD AUTO-RTO: 0 /100
PLATELET # BLD AUTO: 204 10E3/UL (ref 150–450)
POTASSIUM SERPL-SCNC: 3.8 MMOL/L (ref 3.4–5.3)
PROT SERPL-MCNC: 6.6 G/DL (ref 6.4–8.3)
RBC # BLD AUTO: 4.19 10E6/UL (ref 3.8–5.2)
SODIUM SERPL-SCNC: 136 MMOL/L (ref 136–145)
SPECIMEN EXPIRATION DATE: NORMAL
WBC # BLD AUTO: 10.5 10E3/UL (ref 4–11)

## 2023-03-04 PROCEDURE — 250N000011 HC RX IP 250 OP 636: Performed by: ANESTHESIOLOGY

## 2023-03-04 PROCEDURE — 10907ZC DRAINAGE OF AMNIOTIC FLUID, THERAPEUTIC FROM PRODUCTS OF CONCEPTION, VIA NATURAL OR ARTIFICIAL OPENING: ICD-10-PCS | Performed by: OBSTETRICS & GYNECOLOGY

## 2023-03-04 PROCEDURE — 999N000016 HC STATISTIC ATTENDANCE AT DELIVERY

## 2023-03-04 PROCEDURE — 00HU33Z INSERTION OF INFUSION DEVICE INTO SPINAL CANAL, PERCUTANEOUS APPROACH: ICD-10-PCS | Performed by: ANESTHESIOLOGY

## 2023-03-04 PROCEDURE — 370N000003 HC ANESTHESIA WARD SERVICE

## 2023-03-04 PROCEDURE — 80053 COMPREHEN METABOLIC PANEL: CPT | Performed by: OBSTETRICS & GYNECOLOGY

## 2023-03-04 PROCEDURE — 250N000013 HC RX MED GY IP 250 OP 250 PS 637: Performed by: OBSTETRICS & GYNECOLOGY

## 2023-03-04 PROCEDURE — 3E0R3BZ INTRODUCTION OF ANESTHETIC AGENT INTO SPINAL CANAL, PERCUTANEOUS APPROACH: ICD-10-PCS | Performed by: ANESTHESIOLOGY

## 2023-03-04 PROCEDURE — 258N000003 HC RX IP 258 OP 636: Performed by: OBSTETRICS & GYNECOLOGY

## 2023-03-04 PROCEDURE — 85025 COMPLETE CBC W/AUTO DIFF WBC: CPT | Performed by: OBSTETRICS & GYNECOLOGY

## 2023-03-04 PROCEDURE — 59025 FETAL NON-STRESS TEST: CPT

## 2023-03-04 PROCEDURE — 86850 RBC ANTIBODY SCREEN: CPT | Performed by: OBSTETRICS & GYNECOLOGY

## 2023-03-04 PROCEDURE — 86901 BLOOD TYPING SEROLOGIC RH(D): CPT | Performed by: OBSTETRICS & GYNECOLOGY

## 2023-03-04 PROCEDURE — 250N000009 HC RX 250: Performed by: OBSTETRICS & GYNECOLOGY

## 2023-03-04 PROCEDURE — 120N000012 HC R&B POSTPARTUM

## 2023-03-04 PROCEDURE — 722N000001 HC LABOR CARE VAGINAL DELIVERY SINGLE

## 2023-03-04 PROCEDURE — 0KQM0ZZ REPAIR PERINEUM MUSCLE, OPEN APPROACH: ICD-10-PCS | Performed by: OBSTETRICS & GYNECOLOGY

## 2023-03-04 PROCEDURE — G0463 HOSPITAL OUTPT CLINIC VISIT: HCPCS | Mod: 25

## 2023-03-04 PROCEDURE — 86780 TREPONEMA PALLIDUM: CPT | Performed by: OBSTETRICS & GYNECOLOGY

## 2023-03-04 RX ORDER — PROCHLORPERAZINE MALEATE 5 MG
10 TABLET ORAL EVERY 6 HOURS PRN
Status: DISCONTINUED | OUTPATIENT
Start: 2023-03-04 | End: 2023-03-04 | Stop reason: HOSPADM

## 2023-03-04 RX ORDER — OXYTOCIN/0.9 % SODIUM CHLORIDE 30/500 ML
100-340 PLASTIC BAG, INJECTION (ML) INTRAVENOUS CONTINUOUS PRN
Status: DISCONTINUED | OUTPATIENT
Start: 2023-03-04 | End: 2023-03-04

## 2023-03-04 RX ORDER — ONDANSETRON 4 MG/1
4 TABLET, ORALLY DISINTEGRATING ORAL EVERY 6 HOURS PRN
Status: DISCONTINUED | OUTPATIENT
Start: 2023-03-04 | End: 2023-03-04 | Stop reason: HOSPADM

## 2023-03-04 RX ORDER — OXYTOCIN 10 [USP'U]/ML
10 INJECTION, SOLUTION INTRAMUSCULAR; INTRAVENOUS
Status: DISCONTINUED | OUTPATIENT
Start: 2023-03-04 | End: 2023-03-05 | Stop reason: HOSPADM

## 2023-03-04 RX ORDER — NALBUPHINE HYDROCHLORIDE 20 MG/ML
2.5-5 INJECTION, SOLUTION INTRAMUSCULAR; INTRAVENOUS; SUBCUTANEOUS EVERY 6 HOURS PRN
Status: DISCONTINUED | OUTPATIENT
Start: 2023-03-04 | End: 2023-03-05 | Stop reason: HOSPADM

## 2023-03-04 RX ORDER — SODIUM CHLORIDE, SODIUM LACTATE, POTASSIUM CHLORIDE, CALCIUM CHLORIDE 600; 310; 30; 20 MG/100ML; MG/100ML; MG/100ML; MG/100ML
INJECTION, SOLUTION INTRAVENOUS CONTINUOUS
Status: DISCONTINUED | OUTPATIENT
Start: 2023-03-04 | End: 2023-03-04 | Stop reason: HOSPADM

## 2023-03-04 RX ORDER — ROPIVACAINE HYDROCHLORIDE 2 MG/ML
INJECTION, SOLUTION EPIDURAL; INFILTRATION; PERINEURAL
Status: COMPLETED | OUTPATIENT
Start: 2023-03-04 | End: 2023-03-04

## 2023-03-04 RX ORDER — KETOROLAC TROMETHAMINE 30 MG/ML
30 INJECTION, SOLUTION INTRAMUSCULAR; INTRAVENOUS
Status: DISCONTINUED | OUTPATIENT
Start: 2023-03-04 | End: 2023-03-04

## 2023-03-04 RX ORDER — CARBOPROST TROMETHAMINE 250 UG/ML
250 INJECTION, SOLUTION INTRAMUSCULAR
Status: DISCONTINUED | OUTPATIENT
Start: 2023-03-04 | End: 2023-03-05 | Stop reason: HOSPADM

## 2023-03-04 RX ORDER — METHYLERGONOVINE MALEATE 0.2 MG/ML
200 INJECTION INTRAVENOUS
Status: DISCONTINUED | OUTPATIENT
Start: 2023-03-04 | End: 2023-03-04 | Stop reason: HOSPADM

## 2023-03-04 RX ORDER — METOCLOPRAMIDE 10 MG/1
10 TABLET ORAL EVERY 6 HOURS PRN
Status: DISCONTINUED | OUTPATIENT
Start: 2023-03-04 | End: 2023-03-04 | Stop reason: HOSPADM

## 2023-03-04 RX ORDER — NALOXONE HYDROCHLORIDE 0.4 MG/ML
0.2 INJECTION, SOLUTION INTRAMUSCULAR; INTRAVENOUS; SUBCUTANEOUS
Status: DISCONTINUED | OUTPATIENT
Start: 2023-03-04 | End: 2023-03-04 | Stop reason: HOSPADM

## 2023-03-04 RX ORDER — ONDANSETRON 2 MG/ML
4 INJECTION INTRAMUSCULAR; INTRAVENOUS EVERY 6 HOURS PRN
Status: DISCONTINUED | OUTPATIENT
Start: 2023-03-04 | End: 2023-03-04 | Stop reason: HOSPADM

## 2023-03-04 RX ORDER — NALOXONE HYDROCHLORIDE 0.4 MG/ML
0.4 INJECTION, SOLUTION INTRAMUSCULAR; INTRAVENOUS; SUBCUTANEOUS
Status: DISCONTINUED | OUTPATIENT
Start: 2023-03-04 | End: 2023-03-04 | Stop reason: HOSPADM

## 2023-03-04 RX ORDER — IBUPROFEN 400 MG/1
800 TABLET, FILM COATED ORAL
Status: DISCONTINUED | OUTPATIENT
Start: 2023-03-04 | End: 2023-03-04

## 2023-03-04 RX ORDER — METOCLOPRAMIDE HYDROCHLORIDE 5 MG/ML
10 INJECTION INTRAMUSCULAR; INTRAVENOUS EVERY 6 HOURS PRN
Status: DISCONTINUED | OUTPATIENT
Start: 2023-03-04 | End: 2023-03-04 | Stop reason: HOSPADM

## 2023-03-04 RX ORDER — OXYTOCIN 10 [USP'U]/ML
10 INJECTION, SOLUTION INTRAMUSCULAR; INTRAVENOUS
Status: DISCONTINUED | OUTPATIENT
Start: 2023-03-04 | End: 2023-03-04

## 2023-03-04 RX ORDER — OXYTOCIN/0.9 % SODIUM CHLORIDE 30/500 ML
340 PLASTIC BAG, INJECTION (ML) INTRAVENOUS CONTINUOUS PRN
Status: DISCONTINUED | OUTPATIENT
Start: 2023-03-04 | End: 2023-03-04 | Stop reason: HOSPADM

## 2023-03-04 RX ORDER — CARBOPROST TROMETHAMINE 250 UG/ML
250 INJECTION, SOLUTION INTRAMUSCULAR
Status: DISCONTINUED | OUTPATIENT
Start: 2023-03-04 | End: 2023-03-04 | Stop reason: HOSPADM

## 2023-03-04 RX ORDER — TRANEXAMIC ACID 10 MG/ML
1 INJECTION, SOLUTION INTRAVENOUS EVERY 30 MIN PRN
Status: DISCONTINUED | OUTPATIENT
Start: 2023-03-04 | End: 2023-03-04 | Stop reason: HOSPADM

## 2023-03-04 RX ORDER — HYDROCORTISONE 25 MG/G
CREAM TOPICAL 3 TIMES DAILY PRN
Status: DISCONTINUED | OUTPATIENT
Start: 2023-03-04 | End: 2023-03-05 | Stop reason: HOSPADM

## 2023-03-04 RX ORDER — METHYLERGONOVINE MALEATE 0.2 MG/ML
200 INJECTION INTRAVENOUS
Status: DISCONTINUED | OUTPATIENT
Start: 2023-03-04 | End: 2023-03-05 | Stop reason: HOSPADM

## 2023-03-04 RX ORDER — MISOPROSTOL 200 UG/1
400 TABLET ORAL
Status: DISCONTINUED | OUTPATIENT
Start: 2023-03-04 | End: 2023-03-05 | Stop reason: HOSPADM

## 2023-03-04 RX ORDER — BISACODYL 10 MG
10 SUPPOSITORY, RECTAL RECTAL DAILY PRN
Status: DISCONTINUED | OUTPATIENT
Start: 2023-03-04 | End: 2023-03-05 | Stop reason: HOSPADM

## 2023-03-04 RX ORDER — PROCHLORPERAZINE 25 MG
25 SUPPOSITORY, RECTAL RECTAL EVERY 12 HOURS PRN
Status: DISCONTINUED | OUTPATIENT
Start: 2023-03-04 | End: 2023-03-04 | Stop reason: HOSPADM

## 2023-03-04 RX ORDER — ROPIVACAINE HYDROCHLORIDE 2 MG/ML
10 INJECTION, SOLUTION EPIDURAL; INFILTRATION; PERINEURAL ONCE
Status: DISCONTINUED | OUTPATIENT
Start: 2023-03-04 | End: 2023-03-04 | Stop reason: HOSPADM

## 2023-03-04 RX ORDER — ACETAMINOPHEN 325 MG/1
650 TABLET ORAL EVERY 4 HOURS PRN
Status: DISCONTINUED | OUTPATIENT
Start: 2023-03-04 | End: 2023-03-05 | Stop reason: HOSPADM

## 2023-03-04 RX ORDER — IBUPROFEN 400 MG/1
800 TABLET, FILM COATED ORAL EVERY 6 HOURS PRN
Status: DISCONTINUED | OUTPATIENT
Start: 2023-03-04 | End: 2023-03-05 | Stop reason: HOSPADM

## 2023-03-04 RX ORDER — TRANEXAMIC ACID 10 MG/ML
1 INJECTION, SOLUTION INTRAVENOUS EVERY 30 MIN PRN
Status: DISCONTINUED | OUTPATIENT
Start: 2023-03-04 | End: 2023-03-05 | Stop reason: HOSPADM

## 2023-03-04 RX ORDER — MISOPROSTOL 200 UG/1
800 TABLET ORAL
Status: DISCONTINUED | OUTPATIENT
Start: 2023-03-04 | End: 2023-03-04 | Stop reason: HOSPADM

## 2023-03-04 RX ORDER — EPHEDRINE SULFATE 50 MG/ML
5 INJECTION, SOLUTION INTRAMUSCULAR; INTRAVENOUS; SUBCUTANEOUS
Status: DISCONTINUED | OUTPATIENT
Start: 2023-03-04 | End: 2023-03-04 | Stop reason: HOSPADM

## 2023-03-04 RX ORDER — MISOPROSTOL 200 UG/1
800 TABLET ORAL
Status: DISCONTINUED | OUTPATIENT
Start: 2023-03-04 | End: 2023-03-05 | Stop reason: HOSPADM

## 2023-03-04 RX ORDER — FENTANYL CITRATE 50 UG/ML
50 INJECTION, SOLUTION INTRAMUSCULAR; INTRAVENOUS EVERY 30 MIN PRN
Status: DISCONTINUED | OUTPATIENT
Start: 2023-03-04 | End: 2023-03-04 | Stop reason: HOSPADM

## 2023-03-04 RX ORDER — OXYTOCIN/0.9 % SODIUM CHLORIDE 30/500 ML
340 PLASTIC BAG, INJECTION (ML) INTRAVENOUS CONTINUOUS PRN
Status: DISCONTINUED | OUTPATIENT
Start: 2023-03-04 | End: 2023-03-05 | Stop reason: HOSPADM

## 2023-03-04 RX ORDER — OXYTOCIN 10 [USP'U]/ML
10 INJECTION, SOLUTION INTRAMUSCULAR; INTRAVENOUS
Status: DISCONTINUED | OUTPATIENT
Start: 2023-03-04 | End: 2023-03-04 | Stop reason: HOSPADM

## 2023-03-04 RX ORDER — MODIFIED LANOLIN
OINTMENT (GRAM) TOPICAL
Status: DISCONTINUED | OUTPATIENT
Start: 2023-03-04 | End: 2023-03-05 | Stop reason: HOSPADM

## 2023-03-04 RX ORDER — CITRIC ACID/SODIUM CITRATE 334-500MG
30 SOLUTION, ORAL ORAL
Status: DISCONTINUED | OUTPATIENT
Start: 2023-03-04 | End: 2023-03-04 | Stop reason: HOSPADM

## 2023-03-04 RX ORDER — MISOPROSTOL 200 UG/1
400 TABLET ORAL
Status: DISCONTINUED | OUTPATIENT
Start: 2023-03-04 | End: 2023-03-04 | Stop reason: HOSPADM

## 2023-03-04 RX ORDER — DOCUSATE SODIUM 100 MG/1
100 CAPSULE, LIQUID FILLED ORAL DAILY
Status: DISCONTINUED | OUTPATIENT
Start: 2023-03-04 | End: 2023-03-05 | Stop reason: HOSPADM

## 2023-03-04 RX ADMIN — ACETAMINOPHEN 650 MG: 325 TABLET ORAL at 17:08

## 2023-03-04 RX ADMIN — ROPIVACAINE HYDROCHLORIDE 10 ML: 2 INJECTION, SOLUTION EPIDURAL; INFILTRATION at 13:41

## 2023-03-04 RX ADMIN — Medication 340 ML/HR: at 15:06

## 2023-03-04 RX ADMIN — IBUPROFEN 800 MG: 400 TABLET ORAL at 17:08

## 2023-03-04 RX ADMIN — SODIUM CHLORIDE, POTASSIUM CHLORIDE, SODIUM LACTATE AND CALCIUM CHLORIDE 1000 ML: 600; 310; 30; 20 INJECTION, SOLUTION INTRAVENOUS at 13:00

## 2023-03-04 RX ADMIN — SODIUM CHLORIDE, POTASSIUM CHLORIDE, SODIUM LACTATE AND CALCIUM CHLORIDE: 600; 310; 30; 20 INJECTION, SOLUTION INTRAVENOUS at 13:55

## 2023-03-04 RX ADMIN — Medication 12 ML/HR: at 13:41

## 2023-03-04 ASSESSMENT — ACTIVITIES OF DAILY LIVING (ADL)
ADLS_ACUITY_SCORE: 20

## 2023-03-04 NOTE — ANESTHESIA PROCEDURE NOTES
"Epidural catheter Procedure Note    Pre-Procedure   Staff -        Anesthesiologist:  Isaiah Meza MD       Performed By: anesthesiologist       Location: OB       Pre-Anesthestic Checklist: patient identified, IV checked, risks and benefits discussed, informed consent, monitors and equipment checked, pre-op evaluation and at physician/surgeon's request  Timeout:       Correct Patient: Yes        Correct Procedure: Yes        Correct Site: Yes        Correct Position: Yes   Procedure Documentation  Procedure: epidural catheter       Patient Position: sitting       Patient Prep/Sterile Barriers: sterile gloves, mask, patient draped       Skin prep: Betadine       Local skin infiltrated with 3 mL of 1% lidocaine.        Insertion Site: L3-4. (midline approach).       Technique: LORT saline        PHILLIP at 5 cm.       Needle Type: [employer] needle       Needle Gauge: 17.        Needle Length (Inches): 3.5        Catheter: 19 G.          Catheter threaded easily.           Threaded 9 cm at skin.         # of attempts: 1 and  # of redirects:     Assessment/Narrative         Paresthesias: No.       Test dose of 3 mL lidocaine 1.5% w/ 1:200,000 epinephrine at 13:39 CST.         Test dose negative, 3 minutes after injection, for signs of intravascular, subdural, or intrathecal injection.       Insertion/Infusion Method: LORT saline       Aspiration negative for Heme or CSF via Epidural Catheter.    Medication(s) Administered   0.2% Ropivacaine (Epidural) - EPIDURAL   10 mL - 3/4/2023 1:41:00 PM   Comments:  No complications   Secured with Tegaderm, adhesive spray and tape          FOR [facility] ([facility] [place] [place]) ONLY:   Pain Team Contact information: please page the Pain Team Via [place]. Search \"Pain\". During daytime hours, please page the attending first. At night please page the resident first.    "

## 2023-03-04 NOTE — H&P
"[facility] Labor and Delivery Progress Note    Brenda Blount MRN# [id number]   Age: 28 year old YOB: 1995     Refer to prenatal record for full H&P;nursing assessment reviweed        Subjective:     28 yr  at 39+0 admitted in active labor; uncomplicated PNC.  Comfortable w/epidural          Objective:   Patient Vitals for the past 8 hrs:   BP Temp Temp src Pulse Resp SpO2 Height Weight   23 1405 132/75 -- -- -- -- -- -- --   23 1404 121/78 -- -- -- -- -- -- --   23 1401 101/59 -- -- -- -- (!) 82 % -- --   23 1359 (!) 134/92 -- -- -- -- -- -- --   23 1358 -- 98.1  F (36.7  C) -- -- -- -- -- --   23 1356 130/87 -- -- -- -- -- -- --   23 1354 135/86 -- -- -- -- -- -- --   23 1350 133/82 -- -- -- -- 100 % -- --   23 1348 136/81 -- -- 103 -- -- -- --   23 1346 (!) 13492 -- -- 99 -- -- -- --   23 1344 134/82 -- -- 115 -- -- -- --   23 1341 137/81 -- -- 101 -- -- -- --   23 1209 (!) 132/91 98.3  F (36.8  C) Temporal 78 18 -- 1.626 m (5' 4\") 66.2 kg (146 lb)        Cervical Exam:   Complete/0 station  Membranes: AROM - meconium fluid    Fetal Heart Rate:    Monitor:   ext   Variability:   average   Baseline Rate:   140''s w/ occ decelerations with cxts   Fetal Heart Rate Tracing: cat II    GBS neg  A+  EFW 7 #          Assessment:   1)  IUP at  39w0d  2)  Active labor        Plan:   Pt will begin pushing   NICU team for meconium fluid      Flores Geovanna Masters MD        "

## 2023-03-04 NOTE — ANESTHESIA PREPROCEDURE EVALUATION
Anesthesia Pre-Procedure Evaluation    Patient: Brenda Blount   MRN: [id number] : 1995        Procedure : * No procedures listed *          Past Medical History:   Diagnosis Date     Alcohol abuse     off and on     Alcoholic hepatitis      Gallbladder polyp 2019    3mm- 2019, rec repeat US in 1 year     Insomnia     remeron     Renal stone 2019    s/p stent      Past Surgical History:   Procedure Laterality Date     COMBINED CYSTOSCOPY, INSERT STENT URETER(S) Right 2019    Procedure: Cytoscopy with Right double J stent;  Surgeon: Ruiz Cruz MD;  Location: [facility] OR     LASER HOLMIUM LITHOTRIPSY URETER(S), INSERT STENT, COMBINED Right 2019    Procedure: Video cystoscopy, panendoscopy, right double-J stent removal, right ureteroscopy with holmium laser lithotripsy and stone extraction;  Surgeon: Ruiz Cruz MD;  Location: [facility] OR      No Known Allergies   Social History     Tobacco Use     Smoking status: Never     Smokeless tobacco: Never   Substance Use Topics     Alcohol use: Yes     Comment: vodka - once a week      Wt Readings from Last 1 Encounters:   23 66.2 kg (146 lb)        Anesthesia Evaluation            ROS/MED HX  ENT/Pulmonary:    (-) asthma   Neurologic:  - neg neurologic ROS     Cardiovascular:    (-) PIH   METS/Exercise Tolerance:     Hematologic:     (+) no anticoagulation therapy, no coagulopathy,     Musculoskeletal:       GI/Hepatic:     (+) GERD, hepatitis     Renal/Genitourinary:       Endo:       Psychiatric/Substance Use:     (+) alcohol abuse     Infectious Disease:       Malignancy:       Other:            Physical Exam    Airway        Mallampati: II   TM distance: > 3 FB   Neck ROM: full     Respiratory Devices and Support         Dental  no notable dental history         Cardiovascular   cardiovascular exam normal          Pulmonary   pulmonary exam normal                OUTSIDE LABS:  CBC:   Lab Results   Component Value Date    WBC 10.5  03/04/2023    WBC 6.4 07/21/2022    HGB 10.8 (L) 03/04/2023    HGB 11.2 (L) 12/15/2022    HCT 33.3 (L) 03/04/2023    HCT 40.4 07/21/2022     03/04/2023     07/21/2022     BMP:   Lab Results   Component Value Date     12/11/2019     11/26/2019    POTASSIUM 3.9 12/11/2019    POTASSIUM 3.8 11/26/2019    CHLORIDE 103 12/11/2019    CHLORIDE 103 11/26/2019    CO2 26 12/11/2019    CO2 27 11/26/2019    BUN 11 12/11/2019    BUN 13 11/26/2019    CR 0.60 12/11/2019    CR 0.83 11/26/2019    GLC 77 12/11/2019    GLC 98 11/26/2019     COAGS:   Lab Results   Component Value Date    INR 1.01 12/11/2019     POC:   Lab Results   Component Value Date    HCG Negative 12/26/2019    HCGS Negative 11/12/2019     HEPATIC:   Lab Results   Component Value Date    ALBUMIN 3.8 11/26/2019    PROTTOTAL 6.9 11/26/2019    ALT 67 (H) 11/26/2019    AST 40 11/26/2019    ALKPHOS 78 11/26/2019    BILITOTAL 0.5 11/26/2019     OTHER:   Lab Results   Component Value Date    EDER 9.6 12/11/2019    LIPASE 63 (L) 11/12/2019       Anesthesia Plan    ASA Status:  3      Anesthesia Type: Epidural.              Consents    Anesthesia Plan(s) and associated risks, benefits, and realistic alternatives discussed. Questions answered and patient/representative(s) expressed understanding.    - Discussed:     - Discussed with:  Patient         Postoperative Care            Comments:    Other Comments: Orders to manage the epidural infusion have been entered, and through coordination with the nurse, we will continute to manage and monitor the patient's labor epidural.  We will continuously be available to adjust as needed thruout the entire L&D process.             Isaiah Meza MD

## 2023-03-04 NOTE — PLAN OF CARE
Liveborn male born via vaginal delivery at 1505 after less than 1 hour pushing.  Meconium fluid noted, NICU team called.  Continuously monitored during pushing, variables noted during pushing, returning to baseline between.  Apgars 7 and 9.  Mom and baby both transitioning well.

## 2023-03-04 NOTE — PLAN OF CARE
" 39+0 pt admitted to [facility] at 1204, ambulatory per services of Dr. Masters for evaluation of rule out labor.  Pt states she started armida around 0900 today & frequency of contractions have increased to every 3-5 minutes now. Pt states she is having pink mucous vaginal discharge, but denies leaking any fluid. Pt denies any pain/burning/frequency with urination. Pt states her cervix was 1/50/-2 in clinic on Thursday 3/2/23.  Discussed plan of care including EFM with NST, routine VS, and SVE.  Pt agreeable.  EFM applied and admission assessment completed.       23 1210   Vaginal Exam   Method sterile exam per RN   Vaginal Bleeding pink   Cervical Position mid-position   Cervical Consistency soft   Cervical Dilation (cm) 3-4   Cervical Effacement 90%   Fetal Presentation cephalic   Fetal Station -1        23 1227   Provider Notification   Provider Name/Title Dr. Masters   Method of Notification Electronic Page   Request Evaluate - Remote   Notification Reason Patient Arrived;Uterine Activity;Pain;SVE     1250: Pt states \"I feel like I have to push\", SVE recheck as follows:   23 1250   Vaginal Exam   Method sterile exam per RN   Vaginal Bleeding bloody show   Cervical Position mid-position   Cervical Consistency soft   Cervical Dilation (cm) 6   Cervical Effacement 90%   Fetal Presentation cephalic   Fetal Station -1        23 1257   Provider Notification   Provider Name/Title Dr. Masters   Method of Notification Phone   Request Evaluate - Remote   Notification Reason Patient Arrived;Labor Status;Uterine Activity;Pain;Status Update;SVE  (Provider notified of pts arrival & presenting complaints, initial, as well as recheck SVE's, that pt armida every 1.5-4.5 mins, & that NST has been non-reactive thus far. T.O.R.B. received for admission orders.)     1300: Report off given to Carolann AGUILERA RN at this time for admission. IV started & labs drawn.       "

## 2023-03-04 NOTE — TELEPHONE ENCOUNTER
"OB Triage Call      Is patient's OB/Midwife with the [facility] or [facility]? [facility] OB provider       Is patient's delivering hospital on divert? No      Unknown    Estimated Date of Delivery: Data Unavailable        OB History    Para Term  AB Living   1 0 0 0 0 0   SAB IAB Ectopic Multiple Live Births   0 0 0 0 0      # Outcome Date GA Lbr Adiel/2nd Weight Sex Delivery Anes PTL Lv   1 Current                No results found for: GBS       Pt was calling in regards to whether she needs to go in because she thinks shes in labor. Contractions were about every 5 minutes for the last hour and a half. No blood or fluids coming from her vagina. She endorses abdominal pain but only when the contractions are happening. Informed her to call back in half an hour to re-triage if the contractions are still happening.     Reason for Disposition    [1] First baby (primipara) AND [2] contractions > 5 minutes apart OR for < 2 hours    Additional Information    Negative: Passed out (i.e., lost consciousness, collapsed and was not responding)    Negative: Shock suspected (e.g., cold/pale/clammy skin, too weak to stand, low BP, rapid pulse)    Negative: Difficult to awaken or acting confused (e.g., disoriented, slurred speech)    Negative: [1] SEVERE abdominal pain (e.g., excruciating) AND [2] constant AND [3] present > 1 hour    Negative: Severe bleeding (e.g., continuous red blood from vagina, or large blood clots)    Negative: Umbilical cord hanging out of the vagina (shiny, white, curled appearance, \"like telephone cord\")    Negative: Uncontrollable urge to push (i.e., feels like baby is coming out now)    Negative: Can see baby    Negative: Sounds like a life-threatening emergency to the triager    Negative: Pregnant < 37 weeks (i.e., )    Negative: [1] Uncertain delivery date AND [2] possibly pregnant < 37 weeks (i.e., )    Negative: [1] First baby (primipara) AND [2] " contractions < 6 minutes apart  AND [3] present 2 hours    Negative: [1] History of prior delivery (multipara) AND [2] contractions < 10 minutes apart AND [3] present 1 hour    Negative: [1] History of rapid prior delivery AND [2] contractions < 10 minutes apart    Negative: [1] Leakage of fluid from vagina AND [2] green or brown in color    Negative: [1] Leakage of fluid from vagina AND [2] leakage started > 4 hours ago    Negative: Vaginal bleeding or spotting  (Exception: Brief spotting after intercourse or pelvic exam.)    Negative: Baby moving less today (e.g., kick count < 5 in 1 hour or < 10 in 2 hours)    Negative: Severe headache or headache that won't go away    Negative: New blurred vision or vision changes    Negative: MODERATE-SEVERE abdominal pain    Negative: [1] MILD abdominal pain (e.g., doesn't interfere with normal activities) AND [2] constant AND [3] present > 2 hours    Negative: Fever > 100.4 F (38.0 C)    Negative: [1] Leakage of fluid from vagina AND [2] leakage started < 4 hours ago    Negative: Patient sounds very sick or weak to the triager    Negative: [1] First baby (primipara) AND [2] contractions < 10 minutes apart AND [3] present 4 hours or longer    Protocols used: PREGNANCY - LABOR-A-AH    Argentina Levy RN 03/04/23 10:45 AM  [facility] Nurse Advisor

## 2023-03-05 VITALS
WEIGHT: 146 LBS | OXYGEN SATURATION: 99 % | SYSTOLIC BLOOD PRESSURE: 113 MMHG | HEIGHT: 64 IN | DIASTOLIC BLOOD PRESSURE: 72 MMHG | BODY MASS INDEX: 24.92 KG/M2 | HEART RATE: 89 BPM | TEMPERATURE: 98.4 F | RESPIRATION RATE: 16 BRPM

## 2023-03-05 PROBLEM — D64.9 ANEMIA: Status: ACTIVE | Noted: 2023-03-05

## 2023-03-05 LAB
HGB BLD-MCNC: 9 G/DL (ref 11.7–15.7)
T PALLIDUM AB SER QL: NONREACTIVE

## 2023-03-05 PROCEDURE — 36415 COLL VENOUS BLD VENIPUNCTURE: CPT | Performed by: OBSTETRICS & GYNECOLOGY

## 2023-03-05 PROCEDURE — 250N000013 HC RX MED GY IP 250 OP 250 PS 637: Performed by: OBSTETRICS & GYNECOLOGY

## 2023-03-05 PROCEDURE — 85018 HEMOGLOBIN: CPT | Performed by: OBSTETRICS & GYNECOLOGY

## 2023-03-05 RX ORDER — IBUPROFEN 800 MG/1
800 TABLET, FILM COATED ORAL EVERY 6 HOURS PRN
COMMUNITY
Start: 2023-03-05

## 2023-03-05 RX ORDER — FERROUS SULFATE 325(65) MG
325 TABLET ORAL DAILY
COMMUNITY
Start: 2023-03-05

## 2023-03-05 RX ORDER — FERROUS SULFATE 325(65) MG
325 TABLET ORAL DAILY
Status: DISCONTINUED | OUTPATIENT
Start: 2023-03-05 | End: 2023-03-05 | Stop reason: HOSPADM

## 2023-03-05 RX ORDER — DOCUSATE SODIUM 100 MG/1
100 CAPSULE, LIQUID FILLED ORAL DAILY
COMMUNITY
Start: 2023-03-06

## 2023-03-05 RX ORDER — ACETAMINOPHEN 325 MG/1
650 TABLET ORAL EVERY 4 HOURS PRN
COMMUNITY
Start: 2023-03-05

## 2023-03-05 RX ORDER — NALOXONE HYDROCHLORIDE 0.4 MG/ML
0.2 INJECTION, SOLUTION INTRAMUSCULAR; INTRAVENOUS; SUBCUTANEOUS
Status: DISCONTINUED | OUTPATIENT
Start: 2023-03-05 | End: 2023-03-05 | Stop reason: HOSPADM

## 2023-03-05 RX ORDER — NALOXONE HYDROCHLORIDE 0.4 MG/ML
0.4 INJECTION, SOLUTION INTRAMUSCULAR; INTRAVENOUS; SUBCUTANEOUS
Status: DISCONTINUED | OUTPATIENT
Start: 2023-03-05 | End: 2023-03-05 | Stop reason: HOSPADM

## 2023-03-05 RX ADMIN — IBUPROFEN 800 MG: 400 TABLET ORAL at 00:58

## 2023-03-05 RX ADMIN — DOCUSATE SODIUM 100 MG: 100 CAPSULE, LIQUID FILLED ORAL at 07:52

## 2023-03-05 RX ADMIN — ACETAMINOPHEN 650 MG: 325 TABLET ORAL at 15:10

## 2023-03-05 RX ADMIN — IBUPROFEN 800 MG: 400 TABLET ORAL at 07:25

## 2023-03-05 RX ADMIN — ACETAMINOPHEN 650 MG: 325 TABLET ORAL at 07:25

## 2023-03-05 RX ADMIN — ACETAMINOPHEN 650 MG: 325 TABLET ORAL at 00:58

## 2023-03-05 RX ADMIN — FERROUS SULFATE TAB 325 MG (65 MG ELEMENTAL FE) 325 MG: 325 (65 FE) TAB at 10:24

## 2023-03-05 RX ADMIN — IBUPROFEN 800 MG: 400 TABLET ORAL at 15:10

## 2023-03-05 ASSESSMENT — ACTIVITIES OF DAILY LIVING (ADL)
ADLS_ACUITY_SCORE: 20

## 2023-03-05 NOTE — PLAN OF CARE
Vital signs stable. Postpartum assessment WDL. Pain controlled with tylenol and ibuprofen. Patient voiding without difficulty. Bottle feeding infant similac. Patient and infant bonding well. Will continue with current plan of care.

## 2023-03-05 NOTE — PROGRESS NOTES
Post-partum Note      S: Patient is doing well today.  Pain is controlled with PO medications.  Tolerating regular diet without nausea or vomiting.  Ambulating without dizziness.  Urinating without difficulty. Lochia normal.  Bottle feeding. Mood is good. Would like to go home. Baby needs circ today.    O:  Patient Vitals for the past 24 hrs:   BP Temp Temp src Pulse Resp SpO2 Height Weight   03/05/23 0750 116/78 98.1  F (36.7  C) Oral 89 16 -- -- --   03/05/23 0057 121/81 97.5  F (36.4  C) Oral 92 16 -- -- --   03/04/23 2013 -- 98  F (36.7  C) Oral 115 16 -- -- --   03/04/23 1736 135/82 -- -- -- -- -- -- --   03/04/23 1721 137/86 -- -- -- -- -- -- --   03/04/23 1706 128/84 -- -- -- -- -- -- --   03/04/23 1650 130/86 -- -- -- -- -- -- --   03/04/23 1636 128/89 -- -- -- -- -- -- --   03/04/23 1621 127/88 -- -- -- -- -- -- --   03/04/23 1606 134/88 -- -- -- -- -- -- --   03/04/23 1551 (!) 135/92 -- -- -- -- -- -- --   03/04/23 1536 (!) 120/93 -- -- -- -- -- -- --   03/04/23 1521 124/70 -- -- -- -- -- -- --   03/04/23 1456 -- -- -- -- -- 99 % -- --   03/04/23 1451 123/77 -- -- -- -- 100 % -- --   03/04/23 1446 -- -- -- -- -- 100 % -- --   03/04/23 1441 -- -- -- -- -- 100 % -- --   03/04/23 1438 119/59 -- -- -- -- 92 % -- --   03/04/23 1426 -- -- -- -- -- 99 % -- --   03/04/23 1421 -- -- -- -- -- 100 % -- --   03/04/23 1419 132/89 -- -- -- -- -- -- --   03/04/23 1416 130/88 -- -- -- -- -- -- --   03/04/23 1415 -- -- -- -- -- 100 % -- --   03/04/23 1405 132/75 -- -- -- -- -- -- --   03/04/23 1404 121/78 -- -- -- -- -- -- --   03/04/23 1401 101/59 -- -- -- -- (!) 82 % -- --   03/04/23 1359 (!) 134/92 -- -- -- -- -- -- --   03/04/23 1358 -- 98.1  F (36.7  C) -- -- -- -- -- --   03/04/23 1356 130/87 -- -- -- -- -- -- --   03/04/23 1354 135/86 -- -- -- -- -- -- --   03/04/23 1350 133/82 -- -- -- -- 100 % -- --   03/04/23 1348 136/81 -- -- 103 -- -- -- --   03/04/23 1346 (!) 134/92 -- -- 99 -- -- -- --   03/04/23 1344 134/82  "-- -- 115 -- -- -- --   23 1341 137/81 -- -- 101 -- -- -- --   23 1209 (!) 132/91 98.3  F (36.8  C) Temporal 78 18 -- 1.626 m (5' 4\") 66.2 kg (146 lb)       Gen:  Resting comfortably, NAD  Pulm:  Breathing comfortably on room air  Abd:  Soft, appropriately ttp, non-distended.Fundus at  umbilicus, firm and non-tender.  Ext:  non-tender, no bilateral LE edema    I/O last 3 completed shifts:  In: -   Out: 130 [Blood:130]    Hgb:     Hemoglobin   Date Value Ref Range Status   2023 9.0 (L) 11.7 - 15.7 g/dL Final   2023 10.8 (L) 11.7 - 15.7 g/dL Final   2019 13.7 11.7 - 15.7 g/dL Final   2019 13.3 11.7 - 15.7 g/dL Final       Assessment/Plan:  28 year old  on PPD #1 s/p  after spontaneous labor at 39w0d.Second degree perineal lac. S/p tdap and flu shot   Pregnancy complicated with   1.COVID during pregnancy (only one covid vaccine)   2.history of alcohol abuse prior to pregnancy    3.abnormal pap smear, rpt pp     1. Continue with routine postpartum management  2. Pain well controlled   3. EBL: 130 ml ; pre hemoglobin 10.8, post hemogobin 9, no symptoms of anemia.  acute blood loss anemia. On PO iron  4. A+ , Rubella immune  5. Feed: bottle feeding   6. CV/RESP: vss. Mild range bp around time of delivery on 3/4, normal since. Continue to monitor.   7. DVT PPX: ambulation     Dispo: Anticipate DC home PPD1-2. Warning signs reviewed. Follow-up in 6 weeks.    MD Rene Chinchilla OB/GY[facility]  3/5/2023, 9:26 AM    "

## 2023-03-05 NOTE — PLAN OF CARE
Data: Brenda Blount transferred to [facility] via wheelchair at 1800. Baby transferred via parent's arms.  Action: Receiving unit notified of transfer: Yes. Patient and family notified of room change. Report given to Coco HARRIS RN at 1800. Belongings sent to receiving unit. Accompanied by Registered Nurse. Oriented patient to surroundings. Call light within reach. ID bands double-checked with receiving RN.  Response: Patient tolerated transfer and is stable.

## 2023-03-05 NOTE — ANESTHESIA POSTPROCEDURE EVALUATION
Patient: Brenda Blount    Procedure: * No procedures listed *       Anesthesia Type:  Epidural    Note:     Postop Pain Control: Uneventful            Sign Out: Well controlled pain   PONV: No   Neuro/Psych: Uneventful            Sign Out: Acceptable/Baseline neuro status   Airway/Respiratory: Uneventful            Sign Out: Acceptable/Baseline resp. status   CV/Hemodynamics: Uneventful            Sign Out: Acceptable CV status; No obvious hypovolemia; No obvious fluid overload   Other NRE: NONE   DID A NON-ROUTINE EVENT OCCUR? No           Last vitals:  Vitals:    03/04/23 2013 03/05/23 0057 03/05/23 0750   BP:  121/81 116/78   Pulse: 115 92 89   Resp: 16 16 16   Temp: 36.7  C (98  F) 36.4  C (97.5  F) 36.7  C (98.1  F)   SpO2:          Electronically Signed By: Isaiah Meza MD  March 5, 2023  10:14 AM

## 2023-03-05 NOTE — DISCHARGE INSTRUCTIONS
Contact your doctor if you have a temperature >100.4F, if you experience heavy bleeding and are soaking through a pad in less than one hour, if you are not able to eat or drink, or have severe abdominal pain.     Nothing in the vagina for 6 weeks post partum. No intercourse, tampons or douching.   Showers are okay. No soaking in a bath, sitz baths for 15-20 min 2x daily are okay.     Please make a follow up appointment in the office in 6 weeks.     Please call the office with chest pain with shortness of breath or blurry vision with a headache, or upper abdominal pain as this could be concerning for preeclampsia (blood pressure problems with pregnancy and post partum).   Postpartum Vaginal Delivery Instructions    Activity     Ask family and friends for help when you need it.  Do not place anything in your vagina for 6 weeks.  You are not restricted on other activities, but take it easy for a few weeks to allow your body to recover from delivery.  You are able to do any activities you feel up to that point.  No driving until you have stopped taking your pain medications (usually two weeks after delivery).     Call your health care provider if you have any of these symptoms:     Increased pain, swelling, redness, or fluid around your stiches from an episiotomy or perineal tear.  A fever above 100.4 F (38 C) with or without chills when placing a thermometer under your tongue.  You soak a sanitary pad with blood within 1 hour, or you see blood clots larger than a golf ball.  Bleeding that lasts more than 6 weeks.  Vaginal discharge that smells bad.  Severe pain, cramping or tenderness in your lower belly area.  A need to urinate more frequently (use the toilet more often), more urgently (use the toilet very quickly), or it burns when you urinate.  Nausea and vomiting.  Redness, swelling or pain around a vein in your leg.  Problems breastfeeding or a red or painful area on your breast.  Chest pain and cough or are  gasping for air.  Problems coping with sadness, anxiety, or depression.  If you have any concerns about hurting yourself or the baby, call your provider immediately.   You have questions or concerns after you return home.     Keep your hands clean:  Always wash your hands before touching your perineal area and stitches.  This helps reduce your risk of infection.  If your hands aren't dirty, you may use an alcohol hand-rub to clean your hands. Keep your nails clean and short.

## 2023-03-05 NOTE — PLAN OF CARE
Vital signs stable. Postpartum assessment WDL. Hgb 9.0 today started on PO Fe Pain controlled with tylenol and motrin  Patient up ambulating voiding without difficulty. Bottle feeding baby formula  Patient and infant bonding well.Discharge today  Will continue with current plan of care.

## 2023-03-06 NOTE — PLAN OF CARE
D: VSS, assessments WDL.   I: Pt. received complete discharge paperwork and home medications   Pt. was given times of last dose for all discharge medications in writing on discharge medication sheets.  Discharge teaching included home medication, pain management, activity restrictions, postpartum cares, and signs and symptoms of infection.    A: Discharge outcomes on care plan met.  Mother states understanding and comfort with self care and follow up care.   P: Pt. Discharged.  Pt. was accompanied by Spouse and left with personal belongings.   Pt. to follow up with OB provider per discharge instructions.  Pt. had no further questions at the time of discharge and no unmet needs were identified.

## 2023-03-13 NOTE — L&D DELIVERY NOTE
Delivery Date: 2023    TIME OF BIRTH:  3:05 p.m.     A male infant with a weight of 7 pounds 2 ounces and Apgars of 7 at 1 minute and 8 at 5 minutes.    HISTORY OF PRESENT ILLNESS:  The patient is a 28-year-old  1, para 0, who presented to Labor and Delivery on the morning of 2023 in active labor.  The patient has had an uncomplicated prenatal course followed at [facility].    Labs included a blood type of A positive, antibody negative, serology nonreactive, rubella immune.  GCT within normal limits and GBS negative.    The patient was admitted in active labor.  She received an epidural for pain management.  She then progressed along a normal labor curve and had a category 1 fetal heart tracing throughout the entire first stage of labor.    The patient was noted to be completely dilated at 1420 p.m. and I was called to the bedside for rapid progression of labor as well as a category 2 fetal heart tracing just towards the end of the active first stage of labor.    When I arrived at the bedside, the patient had a reactive tracing with good variability and no decelerations.    The patient was prepped for pushing and at 1425, began maternal expulsive efforts.    The patient pushed well.  She did have occasionally some repetitive decelerations with contractions and pushing; however, there was good return to baseline and good variability.    The patient did well, bringing the vertex to a crown and at 305 p.m. delivered a male infant over an intact perineum.    There was no nuchal cord and the anterior shoulder easily delivered.    Of note, the patient did undergo artificial rupture of membranes by myself when she was completely dilated just prior to onset of active second stage of labor, and meconium fluid was noted.  Therefore, the NICU team was present for delivery.    Findings included a male infant, weight of 7 pounds 2 ounces, Apgars of 7 at 1 minute and 8 at 5 minutes.    Placenta delivered  spontaneously at 1508 p.m. and was sent for hospital disposal.    Examination of the perineum showed there to be a small second-degree perineal laceration, repaired in the normal fashion using 3-0 Vicryl.    At the completion of the delivery, mom and baby were stable and doing well.  Total QBL was 130 mL. Sponge, lap and needle counts were correct x 2.    Flores Masters MD        D: 2023   T: 2023   MT: [facility]    Name:     LOY SHAHAlexis  MRN:      [id number]        Account:       [id number]   :      1995           Delivery Date: 2023     Document: [id number]

## 2023-05-07 ENCOUNTER — HEALTH MAINTENANCE LETTER (OUTPATIENT)
Age: 28
End: 2023-05-07

## 2023-05-26 ENCOUNTER — LAB REQUISITION (OUTPATIENT)
Dept: LAB | Facility: CLINIC | Age: 28
End: 2023-05-26

## 2023-05-26 DIAGNOSIS — Z87.42 PERSONAL HISTORY OF OTHER DISEASES OF THE FEMALE GENITAL TRACT: ICD-10-CM

## 2023-05-26 PROCEDURE — G0145 SCR C/V CYTO,THINLAYER,RESCR: HCPCS | Performed by: NURSE PRACTITIONER

## 2023-05-31 LAB
BKR LAB AP GYN ADEQUACY: NORMAL
BKR LAB AP GYN INTERPRETATION: NORMAL
BKR LAB AP HPV REFLEX: NORMAL
BKR LAB AP LMP: NORMAL
BKR LAB AP PREVIOUS ABNL DX: NORMAL
BKR LAB AP PREVIOUS ABNORMAL: NORMAL
PATH REPORT.COMMENTS IMP SPEC: NORMAL
PATH REPORT.COMMENTS IMP SPEC: NORMAL
PATH REPORT.RELEVANT HX SPEC: NORMAL

## 2024-07-14 ENCOUNTER — HEALTH MAINTENANCE LETTER (OUTPATIENT)
Age: 29
End: 2024-07-14

## 2024-07-21 ENCOUNTER — HOSPITAL ENCOUNTER (EMERGENCY)
Facility: CLINIC | Age: 29
Discharge: HOME | End: 2024-07-21
Attending: EMERGENCY MEDICINE | Admitting: EMERGENCY MEDICINE
Payer: COMMERCIAL

## 2024-07-21 VITALS
RESPIRATION RATE: 18 BRPM | OXYGEN SATURATION: 100 % | HEIGHT: 64 IN | BODY MASS INDEX: 18.93 KG/M2 | HEART RATE: 106 BPM | TEMPERATURE: 97 F | WEIGHT: 110.89 LBS | DIASTOLIC BLOOD PRESSURE: 115 MMHG | SYSTOLIC BLOOD PRESSURE: 154 MMHG

## 2024-07-21 DIAGNOSIS — N93.8 DYSFUNCTIONAL UTERINE BLEEDING: ICD-10-CM

## 2024-07-21 LAB
ABO/RH(D): NORMAL
ANTIBODY SCREEN: NEGATIVE
ERYTHROCYTE [DISTWIDTH] IN BLOOD BY AUTOMATED COUNT: 16.6 % (ref 10–15)
HCG INTACT+B SERPL-ACNC: <1 MIU/ML
HCT VFR BLD AUTO: 37 % (ref 35–47)
HGB BLD-MCNC: 11.9 G/DL (ref 11.7–15.7)
MCH RBC QN AUTO: 27.9 PG (ref 26.5–33)
MCHC RBC AUTO-ENTMCNC: 32.2 G/DL (ref 31.5–36.5)
MCV RBC AUTO: 87 FL (ref 78–100)
PLATELET # BLD AUTO: 250 10E3/UL (ref 150–450)
RBC # BLD AUTO: 4.27 10E6/UL (ref 3.8–5.2)
SPECIMEN EXPIRATION DATE: NORMAL
WBC # BLD AUTO: 4.1 10E3/UL (ref 4–11)

## 2024-07-21 PROCEDURE — 36415 COLL VENOUS BLD VENIPUNCTURE: CPT | Performed by: EMERGENCY MEDICINE

## 2024-07-21 PROCEDURE — 99285 EMERGENCY DEPT VISIT HI MDM: CPT | Mod: 25

## 2024-07-21 PROCEDURE — 85027 COMPLETE CBC AUTOMATED: CPT | Performed by: EMERGENCY MEDICINE

## 2024-07-21 PROCEDURE — 84702 CHORIONIC GONADOTROPIN TEST: CPT | Performed by: EMERGENCY MEDICINE

## 2024-07-21 PROCEDURE — 86900 BLOOD TYPING SEROLOGIC ABO: CPT | Performed by: EMERGENCY MEDICINE

## 2024-07-21 ASSESSMENT — COLUMBIA-SUICIDE SEVERITY RATING SCALE - C-SSRS
6. HAVE YOU EVER DONE ANYTHING, STARTED TO DO ANYTHING, OR PREPARED TO DO ANYTHING TO END YOUR LIFE?: NO
1. IN THE PAST MONTH, HAVE YOU WISHED YOU WERE DEAD OR WISHED YOU COULD GO TO SLEEP AND NOT WAKE UP?: NO
2. HAVE YOU ACTUALLY HAD ANY THOUGHTS OF KILLING YOURSELF IN THE PAST MONTH?: NO

## 2024-07-21 ASSESSMENT — ACTIVITIES OF DAILY LIVING (ADL)
ADLS_ACUITY_SCORE: 33
ADLS_ACUITY_SCORE: 33

## 2024-07-21 NOTE — ED TRIAGE NOTES
"Pt believes she is having a miscarriage.  She states that yesterday she was holding her son when she had a \"whoosh\" of fluid.  She states that she passed a couple of large clots and some blood.  She states that today she is still spotting.  She does not know when her last period was but she states it was in early June.       Triage Assessment (Adult)       Row Name 07/21/24 4932          Triage Assessment    Airway WDL WDL        Respiratory WDL    Respiratory WDL WDL        Cardiac WDL    Cardiac WDL WDL                     "

## 2024-07-21 NOTE — ED PROVIDER NOTES
"  Emergency Department Note      History of Present Illness     Chief Complaint   Miscarriage      HPI   Brenda Blount is a 29 year old female  with history of anemia who reports to the ED for a miscarriage. The patient reports she was holding her son when she suddenly felt a gush of blood down her legs with tissue like clots. She states she had some mild abdominal cramping with this. She last had her menstrual cycle about 1.5 months ago; she confirms they are normally regular and was expecting to get hers the first week of this month as her cycle is similar to her coworkers. Significant other adds the patient was feeling ill yesterday and vomiting. Patient denies recent nausea or current pain.     Independent Historian   None    Review of External Notes       Past Medical History     Medical History and Problem List   Alcohol abuse  Alcoholic hepatitis  Gallbladder polyp  Insomnia  Renal stone  Right ureteral calculus   (spontaneous vaginal delivery)  Anemia  Dermatitis     Medications   Fluconazole    Surgical History   Laser holmium lithotripsy ureter(s), insert stent, combined (Right)   Combined cystoscopy, insert stent ureter(s) (Right)       Physical Exam     Patient Vitals for the past 24 hrs:   BP Temp Temp src Pulse Resp SpO2 Height Weight   24 1553 (!) 154/115 97  F (36.1  C) Temporal 106 18 100 % 1.626 m (5' 4\") 50.3 kg (110 lb 14.3 oz)     Physical Exam      CV: ppi, regular   Resp: speaking in full sentences without any resp distress  Skin: warm dry well perfused  Neuro: Alert, no gross motor or sensory deficits,  gait stable      Abdomen: Soft nontender nondistended      Diagnostics     Lab Results   Labs Ordered and Resulted from Time of ED Arrival to Time of ED Departure   CBC WITH PLATELETS - Abnormal       Result Value    WBC Count 4.1      RBC Count 4.27      Hemoglobin 11.9      Hematocrit 37.0      MCV 87      MCH 27.9      MCHC 32.2      RDW 16.6 (*)     Platelet Count 250   "   HCG QUANTITATIVE PREGNANCY - Normal    hCG Quantitative <1     TYPE AND SCREEN, ADULT    ABO/RH(D) A POS      Antibody Screen Negative      SPECIMEN EXPIRATION DATE [id number]         Imaging   No orders to display     Independent Interpretation   None    ED Course      Medications Administered   Medications - No data to display    Procedures   Procedures     Discussion of Management       ED Course   ED Course as of 07/21/24 1921   Sun Jul 21, 2024   7494 I obtained history and examined the patient as noted above.    1602 Patient denies nausea medications    1725 I rechecked the patient and explained findings.        Optional/Additional Documentation  None    Medical Decision Making / Diagnosis     CMS Diagnoses: None    MIPS       None    MDM   Brenda Blount is a 29 year old female presenting with abnormal vaginal bleeding passing clot and possibly tissue.  Abdomen is benign.  Unsure if she is pregnant, erratic.  With last period being about 6 weeks ago.  Abdominal examination is unremarkable.  Our serum hCG is undetectable given the duration of her symptoms if she had been pregnant and this is a miscarriage I would still expect an hCG to be detectable at some level.  Intact order pelvic ultrasound once pregnancy test was negative she preferred to be discharged.  Will return with new or worsening symptoms.    Disposition   The patient was discharged.     Diagnosis     ICD-10-CM    1. Dysfunctional uterine bleeding  N93.8            Discharge Medications   Discharge Medication List as of 7/21/2024  5:32 PM            Scribe Disclosure:  I, Ruby Max, am serving as a scribe at 4:07 PM on 7/21/2024 to document services personally performed by Michael Brunson, * based on my observations and the provider's statements to me.        Michael Brunson MD  07/21/24 1921

## 2025-06-12 ENCOUNTER — LAB REQUISITION (OUTPATIENT)
Dept: LAB | Facility: CLINIC | Age: 30
End: 2025-06-12

## 2025-06-12 DIAGNOSIS — Z11.3 ENCOUNTER FOR SCREENING FOR INFECTIONS WITH A PREDOMINANTLY SEXUAL MODE OF TRANSMISSION: ICD-10-CM

## 2025-06-12 DIAGNOSIS — Z12.4 ENCOUNTER FOR SCREENING FOR MALIGNANT NEOPLASM OF CERVIX: ICD-10-CM

## 2025-06-12 LAB
HIV 1+2 AB+HIV1 P24 AG SERPL QL IA: NONREACTIVE
T PALLIDUM AB SER QL: NONREACTIVE

## 2025-06-12 PROCEDURE — 86780 TREPONEMA PALLIDUM: CPT | Performed by: NURSE PRACTITIONER

## 2025-06-12 PROCEDURE — 87389 HIV-1 AG W/HIV-1&-2 AB AG IA: CPT | Performed by: NURSE PRACTITIONER

## 2025-07-19 ENCOUNTER — HEALTH MAINTENANCE LETTER (OUTPATIENT)
Age: 30
End: 2025-07-19

## (undated) DEVICE — SOL WATER IRRIG 3000ML BAG 2B7117

## (undated) DEVICE — TUBING IRRIG TUR Y TYPE 96" LF 6543-01

## (undated) DEVICE — GUIDEWIRE URO STR STIFF .035"X150CM NITINOL 150NSS35

## (undated) DEVICE — BAG CLEAR TRASH 1.3M 39X33" P4040C

## (undated) DEVICE — SOL WATER IRRIG 1000ML BOTTLE 2F7114

## (undated) DEVICE — PREP TECHNI-CARE CHLOROXYLENOL 3% 4OZ BOTTLE C222-4ZWO

## (undated) DEVICE — COVER FOOTSWITCH W/CINCH 20X24" 923267

## (undated) DEVICE — LASER FIBER HOLMIUM 365UM HTB-365

## (undated) DEVICE — GLOVE PROTEXIS POWDER FREE SMT 7.5  2D72PT75X

## (undated) DEVICE — LINEN TOWEL PACK X5 5464

## (undated) DEVICE — PACK CYSTO CUSTOM RIDGES

## (undated) DEVICE — LINEN FULL SHEET 5511

## (undated) DEVICE — PAD CHUX UNDERPAD 30X36" P3036C

## (undated) DEVICE — LINEN HALF SHEET 5512

## (undated) DEVICE — BASKET RETRIEVAL 1.9FRX120CM ESCAPE NTNL 4 WIRE 390-201

## (undated) RX ORDER — GLYCOPYRROLATE 0.2 MG/ML
INJECTION INTRAMUSCULAR; INTRAVENOUS
Status: DISPENSED
Start: 2019-11-12

## (undated) RX ORDER — HYDROCODONE BITARTRATE AND ACETAMINOPHEN 5; 325 MG/1; MG/1
TABLET ORAL
Status: DISPENSED
Start: 2019-12-26

## (undated) RX ORDER — PROPOFOL 10 MG/ML
INJECTION, EMULSION INTRAVENOUS
Status: DISPENSED
Start: 2019-12-26

## (undated) RX ORDER — FENTANYL CITRATE 50 UG/ML
INJECTION, SOLUTION INTRAMUSCULAR; INTRAVENOUS
Status: DISPENSED
Start: 2019-11-12

## (undated) RX ORDER — CEFAZOLIN SODIUM 2 G/100ML
INJECTION, SOLUTION INTRAVENOUS
Status: DISPENSED
Start: 2019-12-26

## (undated) RX ORDER — LIDOCAINE HYDROCHLORIDE 10 MG/ML
INJECTION, SOLUTION EPIDURAL; INFILTRATION; INTRACAUDAL; PERINEURAL
Status: DISPENSED
Start: 2019-12-26

## (undated) RX ORDER — ONDANSETRON 2 MG/ML
INJECTION INTRAMUSCULAR; INTRAVENOUS
Status: DISPENSED
Start: 2019-11-12

## (undated) RX ORDER — ONDANSETRON 2 MG/ML
INJECTION INTRAMUSCULAR; INTRAVENOUS
Status: DISPENSED
Start: 2019-12-26

## (undated) RX ORDER — DEXAMETHASONE SODIUM PHOSPHATE 4 MG/ML
INJECTION, SOLUTION INTRA-ARTICULAR; INTRALESIONAL; INTRAMUSCULAR; INTRAVENOUS; SOFT TISSUE
Status: DISPENSED
Start: 2019-11-12

## (undated) RX ORDER — PROPOFOL 10 MG/ML
INJECTION, EMULSION INTRAVENOUS
Status: DISPENSED
Start: 2019-11-12

## (undated) RX ORDER — DEXAMETHASONE SODIUM PHOSPHATE 4 MG/ML
INJECTION, SOLUTION INTRA-ARTICULAR; INTRALESIONAL; INTRAMUSCULAR; INTRAVENOUS; SOFT TISSUE
Status: DISPENSED
Start: 2019-12-26

## (undated) RX ORDER — GLYCOPYRROLATE 0.2 MG/ML
INJECTION INTRAMUSCULAR; INTRAVENOUS
Status: DISPENSED
Start: 2019-12-26

## (undated) RX ORDER — FENTANYL CITRATE 50 UG/ML
INJECTION, SOLUTION INTRAMUSCULAR; INTRAVENOUS
Status: DISPENSED
Start: 2019-12-26

## (undated) RX ORDER — LIDOCAINE HYDROCHLORIDE 10 MG/ML
INJECTION, SOLUTION EPIDURAL; INFILTRATION; INTRACAUDAL; PERINEURAL
Status: DISPENSED
Start: 2019-11-12